# Patient Record
Sex: FEMALE | Race: OTHER | URBAN - METROPOLITAN AREA
[De-identification: names, ages, dates, MRNs, and addresses within clinical notes are randomized per-mention and may not be internally consistent; named-entity substitution may affect disease eponyms.]

---

## 2022-09-24 ENCOUNTER — EMERGENCY (EMERGENCY)
Facility: HOSPITAL | Age: 82
LOS: 0 days | Discharge: HOME | End: 2022-09-25
Attending: STUDENT IN AN ORGANIZED HEALTH CARE EDUCATION/TRAINING PROGRAM

## 2022-09-24 VITALS
WEIGHT: 125 LBS | TEMPERATURE: 98 F | OXYGEN SATURATION: 96 % | HEIGHT: 60 IN | SYSTOLIC BLOOD PRESSURE: 144 MMHG | HEART RATE: 85 BPM | DIASTOLIC BLOOD PRESSURE: 85 MMHG | RESPIRATION RATE: 18 BRPM

## 2022-09-24 DIAGNOSIS — M25.511 PAIN IN RIGHT SHOULDER: ICD-10-CM

## 2022-09-24 DIAGNOSIS — Z23 ENCOUNTER FOR IMMUNIZATION: ICD-10-CM

## 2022-09-24 DIAGNOSIS — I10 ESSENTIAL (PRIMARY) HYPERTENSION: ICD-10-CM

## 2022-09-24 DIAGNOSIS — M79.601 PAIN IN RIGHT ARM: ICD-10-CM

## 2022-09-24 DIAGNOSIS — S43.004A UNSPECIFIED DISLOCATION OF RIGHT SHOULDER JOINT, INITIAL ENCOUNTER: ICD-10-CM
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
## 2022-09-24 DIAGNOSIS — E78.5 HYPERLIPIDEMIA, UNSPECIFIED: ICD-10-CM

## 2022-09-24 PROCEDURE — 71046 X-RAY EXAM CHEST 2 VIEWS: CPT | Mod: 26

## 2022-09-24 PROCEDURE — 73060 X-RAY EXAM OF HUMERUS: CPT | Mod: 26,RT

## 2022-09-24 PROCEDURE — 99285 EMERGENCY DEPT VISIT HI MDM: CPT

## 2022-09-24 PROCEDURE — 73030 X-RAY EXAM OF SHOULDER: CPT | Mod: 26,RT

## 2022-09-24 PROCEDURE — 72125 CT NECK SPINE W/O DYE: CPT | Mod: 26,MA

## 2022-09-24 PROCEDURE — 73030 X-RAY EXAM OF SHOULDER: CPT | Mod: 26,RT,77

## 2022-09-24 PROCEDURE — 73080 X-RAY EXAM OF ELBOW: CPT | Mod: 26,RT

## 2022-09-24 PROCEDURE — 70450 CT HEAD/BRAIN W/O DYE: CPT | Mod: 26,MA

## 2022-09-24 PROCEDURE — 73020 X-RAY EXAM OF SHOULDER: CPT | Mod: 26,RT,59,76

## 2022-09-24 RX ORDER — TETANUS TOXOID, REDUCED DIPHTHERIA TOXOID AND ACELLULAR PERTUSSIS VACCINE, ADSORBED 5; 2.5; 8; 8; 2.5 [IU]/.5ML; [IU]/.5ML; UG/.5ML; UG/.5ML; UG/.5ML
0.5 SUSPENSION INTRAMUSCULAR ONCE
Refills: 0 | Status: COMPLETED | OUTPATIENT
Start: 2022-09-24 | End: 2022-09-24

## 2022-09-24 RX ORDER — MORPHINE SULFATE 50 MG/1
2 CAPSULE, EXTENDED RELEASE ORAL ONCE
Refills: 0 | Status: DISCONTINUED | OUTPATIENT
Start: 2022-09-24 | End: 2022-09-24

## 2022-09-24 RX ORDER — ACETAMINOPHEN 500 MG
975 TABLET ORAL ONCE
Refills: 0 | Status: COMPLETED | OUTPATIENT
Start: 2022-09-24 | End: 2022-09-24

## 2022-09-24 RX ORDER — DIAZEPAM 5 MG
5 TABLET ORAL ONCE
Refills: 0 | Status: DISCONTINUED | OUTPATIENT
Start: 2022-09-24 | End: 2022-09-24

## 2022-09-24 RX ORDER — MIDAZOLAM HYDROCHLORIDE 1 MG/ML
2 INJECTION, SOLUTION INTRAMUSCULAR; INTRAVENOUS ONCE
Refills: 0 | Status: DISCONTINUED | OUTPATIENT
Start: 2022-09-24 | End: 2022-09-24

## 2022-09-24 RX ORDER — LIDOCAINE 4 G/100G
1 CREAM TOPICAL ONCE
Refills: 0 | Status: COMPLETED | OUTPATIENT
Start: 2022-09-24 | End: 2022-09-24

## 2022-09-24 RX ADMIN — LIDOCAINE 1 PATCH: 4 CREAM TOPICAL at 17:41

## 2022-09-24 RX ADMIN — Medication 975 MILLIGRAM(S): at 17:41

## 2022-09-24 RX ADMIN — Medication 5 MILLIGRAM(S): at 18:36

## 2022-09-24 RX ADMIN — MIDAZOLAM HYDROCHLORIDE 2 MILLIGRAM(S): 1 INJECTION, SOLUTION INTRAMUSCULAR; INTRAVENOUS at 21:17

## 2022-09-24 RX ADMIN — Medication 975 MILLIGRAM(S): at 18:30

## 2022-09-24 RX ADMIN — MORPHINE SULFATE 2 MILLIGRAM(S): 50 CAPSULE, EXTENDED RELEASE ORAL at 21:17

## 2022-09-24 RX ADMIN — LIDOCAINE 1 PATCH: 4 CREAM TOPICAL at 19:43

## 2022-09-24 RX ADMIN — MORPHINE SULFATE 2 MILLIGRAM(S): 50 CAPSULE, EXTENDED RELEASE ORAL at 21:45

## 2022-09-24 RX ADMIN — TETANUS TOXOID, REDUCED DIPHTHERIA TOXOID AND ACELLULAR PERTUSSIS VACCINE, ADSORBED 0.5 MILLILITER(S): 5; 2.5; 8; 8; 2.5 SUSPENSION INTRAMUSCULAR at 17:42

## 2022-09-24 NOTE — ED PROVIDER NOTE - PHYSICAL EXAMINATION
General appearance: Nontoxic appearing, conversant, afebrile    Eyes: anicteric sclerae, KAYDEN, EOMI   HENT: Atraumatic; oropharynx clear, MMM and no ulcerations, no pharyngeal erythema or exudate. Has 2cm diameter ecchymosis on left side of the forehead with small superficial abrasion overline. No pain with extraocular movement.    Neck: Trachea midline; Full range of motion, supple   Pulm: CTA bl, normal respiratory effort and no intercostal retractions, normal work of breathing   CV: RRR, No murmurs, rubs, or gallops   Abdomen: Soft, non-tender, non-distended; no guarding or rebound   Extremities: No peripheral edema, no gross deformities, FROM x4, 5/5 MS x4, gross sensation intact. Limited ROM in right arm due to pain.  No obvious deformities. Neurovascularly intact.    Skin: Dry, normal temperature, turgor and texture; no rash   Psych: Appropriate affect, cooperative General appearance: Nontoxic appearing, conversant, afebrile    Eyes: anicteric sclerae, KAYDEN, EOMI   HENT: Atraumatic; oropharynx clear, MMM and no ulcerations, no pharyngeal erythema or exudate. Has 2cm diameter ecchymosis on left side of the forehead with small superficial abrasion overline. No pain with extraocular movement.    Neck: Trachea midline; Full range of motion, supple, no midline ttp   Pulm: CTA bl, normal respiratory effort and no intercostal retractions, normal work of breathing   CV: RRR, No murmurs, rubs, or gallops   Abdomen: Soft, non-tender, non-distended; no guarding or rebound   Back: No midline ttp, step offs, deformities, no cvat    Extremities: No peripheral edema, no gross deformities, FROM x4, 5/5 MS x4, gross sensation intact. Limited ROM in right arm due to pain.  No obvious deformities. Neurovascularly intact.    Skin: Dry, normal temperature, turgor and texture; no rash   Psych: Appropriate affect, cooperative

## 2022-09-24 NOTE — ED ADULT NURSE NOTE - NSIMPLEMENTINTERV_GEN_ALL_ED
Implemented All Fall Risk Interventions:  [place] to call system. Call bell, personal items and telephone within reach. Instruct patient to call for assistance. Room bathroom lighting operational. Non-slip footwear when patient is off stretcher. Physically safe environment: no spills, clutter or unnecessary equipment. Stretcher in lowest position, wheels locked, appropriate side rails in place. Provide visual cue, wrist band, yellow gown, etc. Monitor gait and stability. Monitor for mental status changes and reorient to person, place, and time. Review medications for side effects contributing to fall risk. Reinforce activity limits and safety measures with patient and family.

## 2022-09-24 NOTE — ED PROVIDER NOTE - PATIENT PORTAL LINK FT
You can access the FollowMyHealth Patient Portal offered by [facility] by registering at the following website: [email or web address]. By joining [facility]’s FollowMyHealth portal, you will also be able to view your health information using other applications (apps) compatible with our system.

## 2022-09-24 NOTE — ED PROVIDER NOTE - CLINICAL SUMMARY MEDICAL DECISION MAKING FREE TEXT BOX
83 y/o F presenting after mechanical fall. Will image. Rule out acute injuries. Reassess after getting results.

## 2022-09-24 NOTE — ED PROVIDER NOTE - OBJECTIVE STATEMENT
83 y/o F with a PMHx of HTN and HLD presents to the ED after slipping at home today. Patient states that she fell forward and landed on her head and right arm. Denies any head strikes or LOC. Patient reports that she is endorsing pain on the right upper extremities including the right shoulder and the right humerus. As per patient, pain exacerbates with movement of the right arm. Denies any other pain. Denies taking any pain control medication. Denies any numbness or weakness. 81 y/o F with a PMHx of HTN and HLD presents to the ED after slipping at home today while getting out of shower. Patient states that she fell forward and landed on her head and right arm. Denies any head strikes or LOC. Patient reports that she is endorsing pain on the right upper extremities including the right shoulder and the right humerus. Pain exacerbates with movement of the right arm. Denies any other pain. Denies taking any pain control medication. Denies any numbness or weakness.

## 2022-09-24 NOTE — CONSULT NOTE ADULT - SUBJECTIVE AND OBJECTIVE BOX
*** INCOMPLETE NOTE - CHARTING IN PROGRESS ***    82y Female right/left hand dominant presents c/o R/L shoulder pain sp mechanical fall. Denies Headstrike/LOC. Denies numbness, tingling paresthesias in affected extremity. Able to ambulate after fall. No other orthopedic injuries at this time    VITAL SIGNS:  T(C): 36.8 (09-24-22 @ 19:37), Max: 36.8 (09-24-22 @ 19:37)  T(F): 98.2 (09-24-22 @ 19:37), Max: 98.2 (09-24-22 @ 19:37)  HR: 85 (09-24-22 @ 19:37) (85 - 85)  BP: 122/67 (09-24-22 @ 19:37) (122/67 - 144/85)  RR: 17 (09-24-22 @ 19:37) (17 - 18)  SpO2: 95% (09-24-22 @ 19:37) (95% - 96%)    PHYSICAL EXAM  Gen: NAD  RUE:   Skin intact.   No anterior shoulder fullness; +Sulcus sign.   Unable to range shoulder 2/2 pain; Full painless ROM at elbow and wrist.   + Ax/Musc/Rad/Uln/Med/AIN/PIN.   C5-T1 SILT.   Radial pulse intact.   Compartments soft.     IMAGING:  XR Right Shoulder: Inferior glenohumeral joint dislocation.     PROCEDURE: 10 cc 1% lidocaine and 10 cc sterile saline injected under sterile procedure into R/L shoulder joint. Closed reduction performed. Post procedure imaging demonstrates located shoulder joint. Post procedure exam demonstrated NV intact.    *** INCOMPLETE NOTE - CHARTING IN PROGRESS ***    ASSESSMENT:  82F Right shoulder dislocation sp closed reduction    PLAN:  - Pain control  - NWB R/L UE in sling, keep c/d/i  - Ice  - Active movement of fingers/wrist/elbow encouraged  - Possible need for surgical intervention in future discussed with patient  - Follow up with  --- within 1 week, call office for appointment  - Ortho stable  - Will discuss with Dr. Lino and advise of any changes to the above plan.    *** INCOMPLETE NOTE - CHARTING IN PROGRESS ***  [Language line interpreters Grenadian - [id number]]    82F D ambulator w/ walker c/o R shoulder pain s/p mechanical fall. She was getting out of bed earlier this today and tripped. She landed on her R arm and immediately felt R shoulder pain. She was sent to [place] ED. She did hit her head and presents with a small bruise on her forehead, but denies LOC. Denies numbness, tingling paresthesias in the affected extremity. Able to ambulate after fall. Patient endorses Right Big toe pain since the fall. No other orthopedic complaints at this time    VITAL SIGNS:  T(C): 36.8 (09-24-22 @ 19:37), Max: 36.8 (09-24-22 @ 19:37)  T(F): 98.2 (09-24-22 @ 19:37), Max: 98.2 (09-24-22 @ 19:37)  HR: 85 (09-24-22 @ 19:37) (85 - 85)  BP: 122/67 (09-24-22 @ 19:37) (122/67 - 144/85)  RR: 17 (09-24-22 @ 19:37) (17 - 18)  SpO2: 95% (09-24-22 @ 19:37) (95% - 96%)    PHYSICAL EXAM  Gen: Alert, orientated in pain but overall NAD  RUE:   Skin intact.   No anterior shoulder fullness; +Sulcus sign.   Unable to range shoulder 2/2 pain; Full painless ROM at elbow and wrist.   + Ax/Musc/Rad/Uln/Med/AIN/PIN.   C5-T1 SILT.   Radial pulse intact.   Compartments soft.     RLE:   Skin intact and nail bed intact  Arthritic deviations of the toes noted  No TTP at the big toe or anywhere else along the foot  Able to range big toe without pain    IMAGING:  XR Right Shoulder: Inferior glenohumeral joint dislocation.     PROCEDURE: 10 cc 1% lidocaine and 10 cc sterile saline injected under sterile procedure into R/L shoulder joint. Closed reduction performed. Post procedure imaging demonstrates located shoulder joint. Post procedure exam demonstrated NV intact.    ASSESSMENT:  82F Right shoulder dislocation s/p closed reduction    PLAN:  - Pain control  - NWB R/L UE in sling, keep c/d/i  - Ice as needed  - Active movement of fingers/wrist/elbow encouraged  - Possible need for surgical intervention in future discussed with patient  - Follow up with Dr. Lino within 1 week, call office for appointment  - Ortho stable for discharge  - Discussed with Dr. Lino, who agrees with plan.

## 2022-09-24 NOTE — ED PROVIDER NOTE - CARE PROVIDER_API CALL
Cece Lino)  Orthopaedic Surgery; Surgery of the Hand  [address] [place] [address]  Phone: [phone]  Fax: [phone]  Follow Up Time: 4-6 Days

## 2022-09-24 NOTE — ED ADULT NURSE REASSESSMENT NOTE - NS ED NURSE REASSESS COMMENT FT1
pt alert and oriented x4, pt sent to xray
Received report from SELIN Spencer. Pt AOx4, respirations appear equal and unlabored, NAD noted. Safety measures maintained.

## 2022-09-24 NOTE — ED ADULT NURSE NOTE - ED STAT RN HANDOFF DETAILS
Handoff provided to RN Reena CURTIS. Pt is awake/alert Aox4, no active orders at this time. Pending evaluation.

## 2022-09-24 NOTE — ED ADULT NURSE NOTE - ED STAT RN HANDOFF DETAILS 2
Report received from SELIN maguire pt alert and oriented x4. c/o right arm pain s/p fall. sling maintained to R.arm, limited ROM to right arm due to pain, pt able to move fingers, + R. pulses. noted hematoma to L side of forehead. Dr. he made aware. Safety checks compld this shift/Safety rounds completed. Fall +skin precs in place. pending md her.

## 2022-09-24 NOTE — ED ADULT NURSE NOTE - IN THE PAST 12 MONTHS HAVE YOU USED DRUGS OTHER THAN THOSE REQUIRED FOR MEDICAL REASON?
Contacted patient to clarify dose of patient's imdur, patient did take 1 and 1/2 tablet today. Patient was advised after reviewing with Jessica Rowland NP, patient should take 30 mg daily only and if headaches returns patient could try and take 15 mg one half tablet. Patient verbalized understanding and repeated above instructions back to writer.    No

## 2022-09-24 NOTE — ED ADULT NURSE NOTE - OBJECTIVE STATEMENT
Pt received to [facility] for a witnessed fall today. States she was coming out of the bathroom and slipped due to the moist floor. Tried to break her fall with her R arm and injured her R shoulder. Arrives with R am in a sling. Limited ROM of R arm due to pain. Able to move fingers, + R radial pulse. + rom of LUE, LLE, and RLE. Hematoma observed above L eyebrok with small abrasion. Denies LOC after falling. Denies dizziness, changes in vision, N/V. In no apparent distress at this time. Aox4, speech is clear. Awaiting MD her. Citizen of [place] speaking, able to communicate with patient in her primary language (Citizen of [place]). Pt received to [facility] for a witnessed fall today. States she was coming out of the bathroom and slipped due to the moist floor. Tried to break her fall with her R arm and injured her R shoulder. Arrives with R am in a sling. Limited ROM of R arm due to pain. Able to move fingers, + R radial pulse. + ROM of LUE, LLE, and RLE. Hematoma observed above L eyebrow with small abrasion. No active bleeding. No other injuries observed. Denies LOC after falling. Denies dizziness, changes in vision, N/V. In no apparent distress at this time. Aox4, speech is clear. Respirations even/unlabored. No neuro deficits observed. Awaiting MD her.

## 2022-09-25 VITALS
SYSTOLIC BLOOD PRESSURE: 161 MMHG | OXYGEN SATURATION: 95 % | RESPIRATION RATE: 18 BRPM | HEART RATE: 100 BPM | TEMPERATURE: 98 F | DIASTOLIC BLOOD PRESSURE: 73 MMHG

## 2025-07-12 ENCOUNTER — INPATIENT (INPATIENT)
Facility: HOSPITAL | Age: 85
LOS: 3 days | Discharge: NURSING, REHAB OR LONG-TERM CARE | End: 2025-07-16
Attending: STUDENT IN AN ORGANIZED HEALTH CARE EDUCATION/TRAINING PROGRAM | Admitting: STUDENT IN AN ORGANIZED HEALTH CARE EDUCATION/TRAINING PROGRAM
Payer: MEDICARE

## 2025-07-12 VITALS
RESPIRATION RATE: 18 BRPM | OXYGEN SATURATION: 94 % | WEIGHT: 138.01 LBS | HEIGHT: 55 IN | TEMPERATURE: 99 F | DIASTOLIC BLOOD PRESSURE: 82 MMHG | SYSTOLIC BLOOD PRESSURE: 142 MMHG | HEART RATE: 93 BPM

## 2025-07-12 DIAGNOSIS — K21.9 GASTRO-ESOPHAGEAL REFLUX DISEASE WITHOUT ESOPHAGITIS: ICD-10-CM

## 2025-07-12 DIAGNOSIS — R32 UNSPECIFIED URINARY INCONTINENCE: ICD-10-CM

## 2025-07-12 DIAGNOSIS — R53.81 OTHER MALAISE: ICD-10-CM
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
## 2025-07-12 DIAGNOSIS — I10 ESSENTIAL (PRIMARY) HYPERTENSION: ICD-10-CM

## 2025-07-12 DIAGNOSIS — S32.001A STABLE BURST FRACTURE OF UNSPECIFIED LUMBAR VERTEBRA, INITIAL ENCOUNTER FOR CLOSED FRACTURE: ICD-10-CM

## 2025-07-12 DIAGNOSIS — E78.5 HYPERLIPIDEMIA, UNSPECIFIED: ICD-10-CM

## 2025-07-12 LAB
ALBUMIN SERPL ELPH-MCNC: 3 G/DL — LOW (ref 3.3–5)
ALP SERPL-CCNC: 72 U/L — SIGNIFICANT CHANGE UP (ref 40–120)
ALT FLD-CCNC: 30 U/L — SIGNIFICANT CHANGE UP (ref 12–78)
ANION GAP SERPL CALC-SCNC: 8 MMOL/L — SIGNIFICANT CHANGE UP (ref 5–17)
AST SERPL-CCNC: 20 U/L — SIGNIFICANT CHANGE UP (ref 15–37)
BASOPHILS # BLD AUTO: 0.03 K/UL — SIGNIFICANT CHANGE UP (ref 0–0.2)
BASOPHILS NFR BLD AUTO: 0.5 % — SIGNIFICANT CHANGE UP (ref 0–2)
BILIRUB SERPL-MCNC: 0.3 MG/DL — SIGNIFICANT CHANGE UP (ref 0.2–1.2)
BUN SERPL-MCNC: 10 MG/DL — SIGNIFICANT CHANGE UP (ref 7–23)
CALCIUM SERPL-MCNC: 8.9 MG/DL — SIGNIFICANT CHANGE UP (ref 8.5–10.1)
CHLORIDE SERPL-SCNC: 109 MMOL/L — HIGH (ref 96–108)
CO2 SERPL-SCNC: 26 MMOL/L — SIGNIFICANT CHANGE UP (ref 22–31)
CREAT SERPL-MCNC: 0.53 MG/DL — SIGNIFICANT CHANGE UP (ref 0.5–1.3)
EGFR: 91 ML/MIN/1.73M2 — SIGNIFICANT CHANGE UP
EGFR: 91 ML/MIN/1.73M2 — SIGNIFICANT CHANGE UP
EOSINOPHIL # BLD AUTO: 0.12 K/UL — SIGNIFICANT CHANGE UP (ref 0–0.5)
EOSINOPHIL NFR BLD AUTO: 1.8 % — SIGNIFICANT CHANGE UP (ref 0–6)
GLUCOSE SERPL-MCNC: 91 MG/DL — SIGNIFICANT CHANGE UP (ref 70–99)
HCT VFR BLD CALC: 34.6 % — SIGNIFICANT CHANGE UP (ref 34.5–45)
HGB BLD-MCNC: 10.7 G/DL — LOW (ref 11.5–15.5)
IMM GRANULOCYTES NFR BLD AUTO: 0.3 % — SIGNIFICANT CHANGE UP (ref 0–0.9)
LYMPHOCYTES # BLD AUTO: 1.73 K/UL — SIGNIFICANT CHANGE UP (ref 1–3.3)
LYMPHOCYTES # BLD AUTO: 26.6 % — SIGNIFICANT CHANGE UP (ref 13–44)
MCHC RBC-ENTMCNC: 25.1 PG — LOW (ref 27–34)
MCHC RBC-ENTMCNC: 30.9 G/DL — LOW (ref 32–36)
MCV RBC AUTO: 81.2 FL — SIGNIFICANT CHANGE UP (ref 80–100)
MONOCYTES # BLD AUTO: 0.73 K/UL — SIGNIFICANT CHANGE UP (ref 0–0.9)
MONOCYTES NFR BLD AUTO: 11.2 % — SIGNIFICANT CHANGE UP (ref 2–14)
NEUTROPHILS # BLD AUTO: 3.87 K/UL — SIGNIFICANT CHANGE UP (ref 1.8–7.4)
NEUTROPHILS NFR BLD AUTO: 59.6 % — SIGNIFICANT CHANGE UP (ref 43–77)
NRBC BLD AUTO-RTO: 0 /100 WBCS — SIGNIFICANT CHANGE UP (ref 0–0)
PLATELET # BLD AUTO: 352 K/UL — SIGNIFICANT CHANGE UP (ref 150–400)
POTASSIUM SERPL-MCNC: 3.6 MMOL/L — SIGNIFICANT CHANGE UP (ref 3.5–5.3)
POTASSIUM SERPL-SCNC: 3.6 MMOL/L — SIGNIFICANT CHANGE UP (ref 3.5–5.3)
PROT SERPL-MCNC: 6.7 GM/DL — SIGNIFICANT CHANGE UP (ref 6–8.3)
RBC # BLD: 4.26 M/UL — SIGNIFICANT CHANGE UP (ref 3.8–5.2)
RBC # FLD: 17.5 % — HIGH (ref 10.3–14.5)
SODIUM SERPL-SCNC: 143 MMOL/L — SIGNIFICANT CHANGE UP (ref 135–145)
WBC # BLD: 6.5 K/UL — SIGNIFICANT CHANGE UP (ref 3.8–10.5)
WBC # FLD AUTO: 6.5 K/UL — SIGNIFICANT CHANGE UP (ref 3.8–10.5)

## 2025-07-12 PROCEDURE — 93010 ELECTROCARDIOGRAM REPORT: CPT

## 2025-07-12 PROCEDURE — 72131 CT LUMBAR SPINE W/O DYE: CPT | Mod: 26

## 2025-07-12 PROCEDURE — 99285 EMERGENCY DEPT VISIT HI MDM: CPT

## 2025-07-12 PROCEDURE — 72070 X-RAY EXAM THORAC SPINE 2VWS: CPT | Mod: 26

## 2025-07-12 PROCEDURE — 72192 CT PELVIS W/O DYE: CPT | Mod: 26

## 2025-07-12 PROCEDURE — 72100 X-RAY EXAM L-S SPINE 2/3 VWS: CPT | Mod: 26

## 2025-07-12 PROCEDURE — 99222 1ST HOSP IP/OBS MODERATE 55: CPT

## 2025-07-12 RX ORDER — METOPROLOL SUCCINATE 50 MG/1
1 TABLET, EXTENDED RELEASE ORAL
Refills: 0 | DISCHARGE

## 2025-07-12 RX ORDER — MAGNESIUM, ALUMINUM HYDROXIDE 200-200 MG
30 TABLET,CHEWABLE ORAL EVERY 4 HOURS
Refills: 0 | Status: DISCONTINUED | OUTPATIENT
Start: 2025-07-12 | End: 2025-07-16

## 2025-07-12 RX ORDER — ONDANSETRON HCL/PF 4 MG/2 ML
4 VIAL (ML) INJECTION EVERY 8 HOURS
Refills: 0 | Status: DISCONTINUED | OUTPATIENT
Start: 2025-07-12 | End: 2025-07-16

## 2025-07-12 RX ORDER — LIDOCAINE HYDROCHLORIDE 20 MG/ML
1 JELLY TOPICAL ONCE
Refills: 0 | Status: DISCONTINUED | OUTPATIENT
Start: 2025-07-12 | End: 2025-07-12

## 2025-07-12 RX ORDER — ICOSAPENT ETHYL 500 MG/1
2 CAPSULE ORAL
Refills: 0 | DISCHARGE

## 2025-07-12 RX ORDER — FUROSEMIDE 10 MG/ML
20 INJECTION INTRAMUSCULAR; INTRAVENOUS DAILY
Refills: 0 | Status: DISCONTINUED | OUTPATIENT
Start: 2025-07-12 | End: 2025-07-16

## 2025-07-12 RX ORDER — FUROSEMIDE 10 MG/ML
1 INJECTION INTRAMUSCULAR; INTRAVENOUS
Refills: 0 | DISCHARGE

## 2025-07-12 RX ORDER — CYCLOBENZAPRINE HYDROCHLORIDE 15 MG/1
5 CAPSULE, EXTENDED RELEASE ORAL ONCE
Refills: 0 | Status: COMPLETED | OUTPATIENT
Start: 2025-07-12 | End: 2025-07-12

## 2025-07-12 RX ORDER — LIDOCAINE HYDROCHLORIDE 20 MG/ML
1 JELLY TOPICAL ONCE
Refills: 0 | Status: COMPLETED | OUTPATIENT
Start: 2025-07-12 | End: 2025-07-12

## 2025-07-12 RX ORDER — MELATONIN 5 MG
3 TABLET ORAL AT BEDTIME
Refills: 0 | Status: DISCONTINUED | OUTPATIENT
Start: 2025-07-12 | End: 2025-07-16

## 2025-07-12 RX ORDER — VIBEGRON 75 MG/1
1 TABLET, FILM COATED ORAL
Refills: 0 | DISCHARGE

## 2025-07-12 RX ORDER — CYCLOBENZAPRINE HYDROCHLORIDE 15 MG/1
5 CAPSULE, EXTENDED RELEASE ORAL THREE TIMES A DAY
Refills: 0 | Status: DISCONTINUED | OUTPATIENT
Start: 2025-07-12 | End: 2025-07-16

## 2025-07-12 RX ORDER — TRAMADOL HYDROCHLORIDE 50 MG/1
25 TABLET, FILM COATED ORAL EVERY 6 HOURS
Refills: 0 | Status: DISCONTINUED | OUTPATIENT
Start: 2025-07-12 | End: 2025-07-13

## 2025-07-12 RX ORDER — ATORVASTATIN CALCIUM 80 MG/1
10 TABLET, FILM COATED ORAL AT BEDTIME
Refills: 0 | Status: DISCONTINUED | OUTPATIENT
Start: 2025-07-12 | End: 2025-07-16

## 2025-07-12 RX ORDER — METOPROLOL SUCCINATE 50 MG/1
25 TABLET, EXTENDED RELEASE ORAL DAILY
Refills: 0 | Status: DISCONTINUED | OUTPATIENT
Start: 2025-07-12 | End: 2025-07-16

## 2025-07-12 RX ORDER — ACETAMINOPHEN 500 MG/5ML
650 LIQUID (ML) ORAL ONCE
Refills: 0 | Status: COMPLETED | OUTPATIENT
Start: 2025-07-12 | End: 2025-07-12

## 2025-07-12 RX ORDER — ATORVASTATIN CALCIUM 80 MG/1
1 TABLET, FILM COATED ORAL
Refills: 0 | DISCHARGE

## 2025-07-12 RX ORDER — IBANDRONATE SODIUM 150 MG/1
1 TABLET ORAL
Refills: 0 | DISCHARGE

## 2025-07-12 RX ORDER — ACETAMINOPHEN 500 MG/5ML
650 LIQUID (ML) ORAL EVERY 6 HOURS
Refills: 0 | Status: DISCONTINUED | OUTPATIENT
Start: 2025-07-12 | End: 2025-07-16

## 2025-07-12 RX ADMIN — Medication 650 MILLIGRAM(S): at 13:29

## 2025-07-12 RX ADMIN — ATORVASTATIN CALCIUM 10 MILLIGRAM(S): 80 TABLET, FILM COATED ORAL at 21:44

## 2025-07-12 RX ADMIN — Medication 2 MILLIGRAM(S): at 18:21

## 2025-07-12 RX ADMIN — CYCLOBENZAPRINE HYDROCHLORIDE 5 MILLIGRAM(S): 15 CAPSULE, EXTENDED RELEASE ORAL at 16:41

## 2025-07-12 RX ADMIN — LIDOCAINE HYDROCHLORIDE 1 PATCH: 20 JELLY TOPICAL at 13:29

## 2025-07-12 NOTE — ED PROVIDER NOTE - ATTENDING APP SHARED VISIT CONTRIBUTION OF CARE
This patient was evaluated with ROSHNI Fish.  The patient's HPI, ROS, and physical exam above were noted and agreed to unless otherwise commented on below.  Nursing notes and vital signs were reviewed.     HPI: Patient is an 84-year-old female presenting to the emergency department today for back pain.  She has a history of hypertension, hyperlipidemia, and prior right hip surgery with lower back pain.  She fell on Monday and fell backwards hitting her head.  Went to [facility] CT of the head was done.  Ultimately discharged to home.  States that since then she has been experiencing pain in her lower back with difficulty walking.  No urinary retention.  No fecal incontinence.  No numbness tingling in extremities.  She states that she has difficulty walking secondary to pain.  No other complaints at this time.    Pertinent Physical Exam:   GENERAL: The patient appears well and is in no apparent distress.    EYES: Pupils equal and reactive.  Extraocular eye movements are intact.    ENT: Head is atraumatic.  Posterior oropharynx is unremarkable.      SKIN: Skin is intact without evidence of significant lacerations or sores.    MUSCULOSKELETAL: Tender to palpation over the lumbar spine.    NEUROLOGIC: Cranial nerves II through XII are grossly within normal limits.  Sensory and motor examination is unremarkable.    PSYCHIATRIC: Patient is awake, alert, and oriented ×4.    MDM: Patient was initially given Tylenol, Flexeril, lidocaine patches and morphine.    On reevaluation, the patient had continued pain in the area.  X-rays and CT scans were taken that show evidence of acute L1 burst compression fracture with moderate loss of height as well as grade 1 L3-S1 anterolisthesis.    Orthopedic surgery was consulted and they state that there is no surgical intervention required at this time.  Patient's pain was managed.    Did discuss discharge to home with the patient, however the patient states that she feels unsafe for discharge at this time.  She has a lot of difficulty with walking and feels that she would be best served in a rehab facility.  She would like to be placed at this time.    For this reason, we believe the patient would benefit from admission to the hospital for further workup and management.  Discussed this case with the hospitalist service who accepted admission for the patient.

## 2025-07-12 NOTE — H&P ADULT - NSHPPHYSICALEXAM_GEN_ALL_CORE
T(C): 37.1 (07-12-25 @ 20:53), Max: 37.4 (07-12-25 @ 11:12)  HR: 74 (07-12-25 @ 20:53) (74 - 93)  BP: 135/79 (07-12-25 @ 20:53) (135/79 - 142/82)  RR: 18 (07-12-25 @ 20:53) (18 - 18)  SpO2: 92% (07-12-25 @ 20:53) (92% - 94%)    CONSTITUTIONAL: Well groomed, in mild distress, pleasant, conversational  EYES: PERRLA and symmetric, EOMI  ENMT: Oral mucosa with moist membranes  RESP: No respiratory distress, no use of accessory muscles; CTA b/l  CV: RRR  GI: Soft, NT, ND

## 2025-07-12 NOTE — ED PROVIDER NOTE - CLINICAL SUMMARY MEDICAL DECISION MAKING FREE TEXT BOX
85 y/o female with htn, hld here with lower back pain x 6 days s/p fall.   No neurological deficits or red flags.   Will obtain ct pelvis and ct lumbar spine r/o fracture and treat with pain meds. 85 y/o female with htn, hld here with lower back pain x 6 days s/p fall.   No neurological deficits or red flags.   Will obtain ct pelvis and ct lumbar spine r/o fracture and treat with pain meds.    ct lumbar: Acute L1 burst compression fracture with moderate loss of height   centrally in the setting of osteopenia. Mild bony retropulsion.  Grade 1 L3-S1 anterolisthesis and uncovering of the disc due to   hypertrophic facet/ligamentous degeneration    ct pelvis: CT of the pelvis demonstrates surgical changes at the right hemipelvis   including a right total hip arthroplasty and threaded screws along the   superior margin of the acetabulum and iliac wing. Heterotopic   ossification along the superior lateral margin of the right hip. There is   no evidence of acute fracture or dislocation.  Ortho was consulted for acute l1 burst compression fracture.   Pt was seen by ortho and cleared to be discharged home. Upon reassessment pt still with lower back pain and cant really walk due to pain. Will admit for PT possibly rehab.

## 2025-07-12 NOTE — H&P ADULT - ASSESSMENT
Teresa Dallas is an 84 year old female with PMHx of HTN, HLD, GERD, and urinary incontinence who presented to the ED on 7/12/25 for complaints of waist pain and admitted for physical deconditioning secondary to L1 fracture due to mechanical fall.    Physical deconditioning secondary to L1 fracture due to mechanical fall  Reports fall while ambulating to bathroom on Monday, landed on her back and hit her head  Went to [facility] and underwent CT head at that time which was reportedly unremarkable, able to ambulate at that time so discharged home  Started having pain around her waist with inability to ambulate the following day on Tuesday  No associated saddle anesthesia or bowel incontinence, has baseline urinary incontinence  Baseline functional status is ambulates with cane  CT L-spine with acute L1 burst compression fracture with moderate loss of height centrally in the setting of osteopenia, mild bony retropulsion, grade 1 L3-S1 anterolisthesis and uncovering of the disc due to hypertrophic facet/ligamentous degeneration  CT pelvis without evidence of acute fracture or dislocation  S/p acetaminophen 650 mg PO, cyclobenzaprine 5 mg PO, lidocaine patch, and morphine 2 mg IV in the ED  Pain management PRN, abdominal binder for comfort, fall precautions  Will need TLSO brace outpatient, PT/OT WBAT as per ortho  Will need outpatient f/u with orthopedic surgery in 3-5 days  Orthopedic surgery recommendations appreciated      Chronic medical conditions:  HTN: PTA metoprolol succinate 25 mg, furosemide 20 mg  HLD: PTA atorvastatin 10 mg, vascepa 1 g held given not on formulary  GERD: PTA pantoprazole 20 mg reordered as 40 mg  Urinary incontinence: PTA vibegron 75 mg held given not on formulary    Medication reconciliation completed using recent prescription fill history from [facility] as patient does not know med list. Please call her pharmacy for med list in AM.

## 2025-07-12 NOTE — H&P ADULT - HISTORY OF PRESENT ILLNESS
Teresa Dallas is an 84 year old female with PMHx of HTN, HLD, GERD, and urinary incontinence who presented to the ED on 7/12/25 for complaints of waist pain.    Buck ID #[id number] utilized. Patient reports she was going to use the bathroom on Monday and fell, landed on her back and hit her head. No loss of consciousness. At that time, went to an ER in [place] and underwent head imaging which was reportedly negative. Discharged home and able to ambulate at that time. The following day, on Tuesday, started having pain around her waist. Described as "so strong I was unable to walk." Severity was 10/10. Did take analgesics which only temporarily alleviated pain. No associated saddle anesthesia or bowel incontinence. Has baseline urinary incontinence. Came to the ER for further evaluation. Baseline functional status is ambulates with cane and dependent with all ADLs. Lives at home with sister and brother-in-law.    In the ED, VSS except Tmax 99.3. Labs grossly unremarkable except hgb 10.7. CT L-spine with acute L1 burst compression fracture with moderate loss of height centrally in the setting of osteopenia. Mild bony retropulsion. Grade 1 L3-S1 anterolisthesis and uncovering of the disc due to hypertrophic facet/ligamentous degeneration. CT pelvis without evidence of acute fracture or dislocation. Received acetaminophen 650 mg PO, cyclobenzaprine 5 mg PO, lidocaine patch, and morphine 2 mg IV. Evaluated by orthopedic surgery who recommends WBAT and abdominal binder for comfort.

## 2025-07-12 NOTE — ED PROVIDER NOTE - PHYSICAL EXAMINATION
GEN: Awake, alert, interactive, NAD.  HEAD AND NECK: NC/AT. Airway patent. Neck supple.   EYES:  Clear b/l. EOMI. PERRL.   ENT: Moist mucus membranes.   CARDIAC: Regular rate, regular rhythm. No evident pedal edema.    RESP/CHEST: Normal respiratory effort with no use of accessory muscles or retractions. Clear throughout on auscultation.  ABD: soft, non-distended, non-tender. No rebound, no guarding.   BACK: (+) diffuse lumbar and sacral tenderness.  No CVAT.   EXTREMITIES: Moving all extremities with no apparent deformities.   SKIN: Warm, dry, intact normal color. No rash.   NEURO: AOx3, CN II-XII grossly intact, no focal deficits.   PSYCH: Appropriate mood and affect.

## 2025-07-12 NOTE — ED PROVIDER NOTE - CARE PLAN
Principal Discharge DX:	Low back pain   1 Principal Discharge DX:	Low back pain  Secondary Diagnosis:	Lumbar burst fracture

## 2025-07-12 NOTE — H&P ADULT - NSICDXPASTMEDICALHX_GEN_ALL_CORE_FT
PAST MEDICAL HISTORY:  GERD (gastroesophageal reflux disease)     HLD (hyperlipidemia)     Hypertension     Urinary incontinence

## 2025-07-12 NOTE — ED PROVIDER NOTE - OBJECTIVE STATEMENT
85 y/o female with htn, hld , history right hip surgery here with lower back pain s/p fall on monday. Pt states she was hit with a door and fell backwards hitting her head. Pt went to [facility] and had ct head done which was negative. Pt the next day noted lower back pain with difficulty walking. Pt denies numbness, tingling, weakness in the legs. Pt has chronic urinary incontinence. Denies bowel incontinence. Pt didn't take anything for pain. Pain is worse with movement. Denies fever, chills, nausea, vomiting, abdominal pain.

## 2025-07-12 NOTE — ED ADULT TRIAGE NOTE - CHIEF COMPLAINT QUOTE
83 yo female with hx of HTN, HLD presents to ED c/o back pain, reports falling on Monday, landing on her buttocks , + head strike, denies loc. Reports went to ED and had head CT scan that was negative for acute findings. States on Tuesday she started to have lower back pain, has been unable to walk for more than a couple of steps. Reports taking Neurobion for pain at 4;30am without relief, Denies numbness tingling, loss of urinary/bowel control. Hx HTN, HLD 83 yo female with hx of HTN, HLD presents to ED c/o back pain, reports falling on Monday, landing on her buttocks , + head strike, denies loc. Reports went to ED and had head CT scan that was negative for acute findings. States on Tuesday she started to have lower back pain, has been unable to walk for more than a couple of steps. Reports taking Neurobion for pain at 4;30am without relief, Denies numbness tingling, loss of urinary/bowel control.

## 2025-07-12 NOTE — H&P ADULT - NSHPLABSRESULTS_GEN_ALL_CORE
10.7   6.50  )-----------( 352      ( 12 Jul 2025 18:00 )             34.6     143  |  109[H]  |  10  ----------------------------<  91     07-12  3.6   |  26  |  0.53    Ca    8.9      12 Jul 2025 18:00    TPro  6.7  /  Alb  3.0[L]  /  TBili  0.3  /  DBili  x   /  AST  20  /  ALT  30  /  AlkPhos  72  07-12    CT L-spine without contrast 7/12/25  FINDINGS:  Acute L1 burst compression fracture with subchondral sclerosis and approximately 33% loss of height centrally. There is cortical disruption along the superior endplate extending into the anterior cortex along a prominent osteophyte. Mild bony retropulsion into the canal. Diffuse osteopenia is present. Remaining vertebral body heights are maintained.    Grade 1 anterolisthesis of L3 on L4, L4 on L5, and L5 on S1 due to advanced hypertrophic facet/ligamentous degeneration and mild uncovering of the disc. Advanced L3-L4 disc degeneration with near complete loss of height and endplate irregularity and prominent osteophyte formation, asymmetric to the left.    Sacroiliac joints are patent bilaterally. Changes from right hip arthroplasty are partially imaged.    Multiple calcifications associated with the right uterus/adnexa may reflect underlying calcified fibroid. Atherosclerotic calcification of the abdominal aorta and major branch vessels is noted.    IMPRESSION:  *Acute L1 burst compression fracture with moderate loss of height centrally in the setting of osteopenia. Mild bony retropulsion.    Grade 1 L3-S1 anterolisthesis and uncovering of the disc due to hypertrophic facet/ligamentous degeneration    CT pelvis bony without contrast 7/12/25  FINDINGS:  Mild anterolisthesis at the L3-L4 level. Multilevel degenerative changes of the lumbar spine and facet joints. Please see same-day CT of the lumbar spine dictated separately for complete evaluation.    Osteopenia is present. Degenerative changes of the sacroiliac joints and the pubic symphysis.    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    Right total hip arthroplasty with cerclage wires and threaded screws along the superior margin of the acetabulum/iliac wing. Heterotopic ossification along the lateral superior margin of the hip arthroplasty. There is no acute fracture or dislocation appreciated. No bulky ostial lysis.    Atherosclerotic vascular calcifications are present. Diffuse bladder wall thickening, nonspecific in the nondilated setting. Colonic diverticulosis is present. Moderate atrophy of the right adductor muscle bellies.    IMPRESSION:  CT of the pelvis demonstrates surgical changes at the right hemipelvis including a right total hip arthroplasty and threaded screws along the superior margin of the acetabulum and iliac wing. Heterotopic ossification along the superior lateral margin of the right hip. There is no evidence of acute fracture or dislocation.

## 2025-07-12 NOTE — ED ADULT NURSE NOTE - CHIEF COMPLAINT QUOTE
85 yo female with hx of HTN, HLD presents to ED c/o back pain, reports falling on Monday, landing on her buttocks , + head strike, denies loc. Reports went to ED and had head CT scan that was negative for acute findings. States on Tuesday she started to have lower back pain, has been unable to walk for more than a couple of steps. Reports taking Neurobion for pain at 4;30am without relief, Denies numbness tingling, loss of urinary/bowel control.

## 2025-07-12 NOTE — ED ADULT NURSE REASSESSMENT NOTE - NS ED NURSE REASSESS COMMENT FT1
aox4, amb w cane w difficulty 2/2 pain, Faroese speaking primarily. Pt offered pain meds however refused, states she will have later. IV site & patency inspected and integrity maintained. R FA 20g patent. pending bed for admission

## 2025-07-12 NOTE — CONSULT NOTE ADULT - SUBJECTIVE AND OBJECTIVE BOX
Brianna Dallas is a pleasant 84F who presented to [facility] ED for residual axial lumbar back pain following a MF on 7/7. Patient endorses +HS, which was worked up at an outside ED that evening with negative imaging. She states she has been able to ambulate with a cane following the injury, which is her baseline, though she reports difficulty 2/2 pain. Patient denies numbness/tingling, saddle anesthesia, bowel incontinence, radicular pain and new weakness. Patient does endorse chronic urinary incontinence managed with medications. No AC.      VITAL SIGNS:  T(C): 37.4 (07-12-25 @ 11:12), Max: 37.4 (07-12-25 @ 11:12)  HR: 93 (07-12-25 @ 11:12) (93 - 93)  BP: 142/82 (07-12-25 @ 11:12) (142/82 - 142/82)  RR: 18 (07-12-25 @ 11:12) (18 - 18)  SpO2: 94% (07-12-25 @ 11:12) (94% - 94%)    Gen: lying comfortably, in NAD    Motor:                   C5                C6              C7               C8           T1   R            5/5                5/5            5/5             5/5          5/5  L             5/5               5/5             5/5             5/5          5/5                L2             L3             L4               L5            S1  R         5/5           5/5          5/5             5/5           5/5  L          5/5          5/5           5/5             5/5           5/5    Sensory:            C5         C6         C7      C8       T1        (0=absent, 1=impaired, 2=normal, NT=not testable)  R         2            2           2        2         2  L          2            2           2        2         2               L2          L3         L4      L5       S1         (0=absent, 1=impaired, 2=normal, NT=not testable)  R         2            2            2        2        2  L          2            2           2        2         2    Positive Casillas Bilaterally  Negative Babinski, and clonus bilaterally  Radial 2+ bilaterally  DP 2+ bilaterally     < from: CT Lumbar Spine No Cont (07.12.25 @ 13:51) >  PROCEDURE DATE:  07/12/2025          INTERPRETATION:  HISTORY: Low back pain. Fall.    COMPARISON: None.    TECHNIQUE: Axial noncontrast CT images of the lumbar spine were obtained.   Coronal and sagittal reconstructions were performed. Bone and soft tissue   windows were evaluated.    FINDINGS:    Acute L1 burst compression fracture with subchondral sclerosis and   approximately 33% loss of height centrally. There is cortical disruption   along the superior endplate extending into the anterior cortex along a   prominent osteophyte. Mild bony retropulsion into the canal. Diffuse   osteopenia is present. Remaining vertebral body heights are maintained.    Grade 1 anterolisthesis of L3 on L4, L4 on L5, and L5 on S1 due to   advanced hypertrophic facet/ligamentous degeneration and mild uncovering   of the disc. Advanced L3-L4 disc degeneration with near complete loss of   height and endplate irregularity and prominent osteophyte formation,   asymmetric to the left.    Sacroiliac joints are patent bilaterally.  Changes from right hip arthroplasty are partially imaged.    Multiple calcifications associated with the right uterus/adnexa may   reflect underlying calcifiedfibroid.  Atherosclerotic calcification of the abdominal aorta and major branch   vessels is noted.    IMPRESSION:    *Acute L1 burst compression fracture with moderate loss of height   centrally in the setting of osteopenia. Mild bony retropulsion.    Grade 1 L3-S1 anterolisthesis and uncovering of the disc due to   hypertrophic facet/ligamentous degeneration    --- End of Report ---    < end of copied text >    A&P:  84F who presented to [facility] ED for residual axial lumbar back pain following a MF on 7/7.  Finding of Acute L1 Burst Fx and L3-S1 Grade Anterolisthesis on CT Scan. No concern for cauda equina syndrome. Recommend conservative treatment and outpatient follow up with Dr. Sigala.    **** INCOMPLETE NOTE****PENDING FURTHER RECOMMENDATIONS****    -Pain control per ED  -Flexeril 5 TID PRN  -WBAT  -DVT ppx encourage ambulation as tolerated   -Abdominal binder for comfort   -TLSO brace outpatient if discharged home today  -F/u outpatient in 3-5 days  -XR T/L Spine ordered    !!!!!! Incomplete Consult Note, More Information to Follow  !!!!!!  !!!!!! Incomplete Consult Note, More Information to Follow  !!!!!!  !!!!!! Incomplete Consult Note, More Information to Follow  !!!!!!  !!!!!! Incomplete Consult Note, More Information to Follow  !!!!!!  !!!!!! Incomplete Consult Note, More Information to Follow  !!!!!!    Plan to be discussed with Dr. Sigala, any future changes will be documented Brianna Dallas is a pleasant 84F who presented to [facility] ED for residual axial lumbar back pain following a MF on 7/7. Patient endorses +HS, which was worked up at an outside ED that evening with negative imaging. She states she has been able to ambulate with a cane following the injury, which is her baseline, though she reports difficulty 2/2 pain. Patient denies numbness/tingling, saddle anesthesia, bowel incontinence, radicular pain and new weakness. Patient does endorse chronic urinary incontinence managed with medications. No AC.      VITAL SIGNS:  T(C): 37.4 (07-12-25 @ 11:12), Max: 37.4 (07-12-25 @ 11:12)  HR: 93 (07-12-25 @ 11:12) (93 - 93)  BP: 142/82 (07-12-25 @ 11:12) (142/82 - 142/82)  RR: 18 (07-12-25 @ 11:12) (18 - 18)  SpO2: 94% (07-12-25 @ 11:12) (94% - 94%)    Gen: lying comfortably, in NAD    Motor:                   C5                C6              C7               C8           T1   R            5/5                5/5            5/5             5/5          5/5  L             5/5               5/5             5/5             5/5          5/5                L2             L3             L4               L5            S1  R         5/5           5/5          5/5             5/5           5/5  L          5/5          5/5           5/5             5/5           5/5    Sensory:            C5         C6         C7      C8       T1        (0=absent, 1=impaired, 2=normal, NT=not testable)  R         2            2           2        2         2  L          2            2           2        2         2               L2          L3         L4      L5       S1         (0=absent, 1=impaired, 2=normal, NT=not testable)  R         2            2            2        2        2  L          2            2           2        2         2    Positive Casillas Bilaterally  Negative Babinski, and clonus bilaterally  Radial 2+ bilaterally  DP 2+ bilaterally     < from: CT Lumbar Spine No Cont (07.12.25 @ 13:51) >  PROCEDURE DATE:  07/12/2025          INTERPRETATION:  HISTORY: Low back pain. Fall.    COMPARISON: None.    TECHNIQUE: Axial noncontrast CT images of the lumbar spine were obtained.   Coronal and sagittal reconstructions were performed. Bone and soft tissue   windows were evaluated.    FINDINGS:    Acute L1 burst compression fracture with subchondral sclerosis and   approximately 33% loss of height centrally. There is cortical disruption   along the superior endplate extending into the anterior cortex along a   prominent osteophyte. Mild bony retropulsion into the canal. Diffuse   osteopenia is present. Remaining vertebral body heights are maintained.    Grade 1 anterolisthesis of L3 on L4, L4 on L5, and L5 on S1 due to   advanced hypertrophic facet/ligamentous degeneration and mild uncovering   of the disc. Advanced L3-L4 disc degeneration with near complete loss of   height and endplate irregularity and prominent osteophyte formation,   asymmetric to the left.    Sacroiliac joints are patent bilaterally.  Changes from right hip arthroplasty are partially imaged.    Multiple calcifications associated with the right uterus/adnexa may   reflect underlying calcifiedfibroid.  Atherosclerotic calcification of the abdominal aorta and major branch   vessels is noted.    IMPRESSION:    *Acute L1 burst compression fracture with moderate loss of height   centrally in the setting of osteopenia. Mild bony retropulsion.    Grade 1 L3-S1 anterolisthesis and uncovering of the disc due to   hypertrophic facet/ligamentous degeneration    --- End of Report ---    < end of copied text >    A&P:  84F who presented to [facility] ED for residual axial lumbar back pain following a MF on 7/7.  Finding of Acute L1 Burst Fx and L3-S1 Grade Anterolisthesis on CT Scan. No concern for cauda equina syndrome. Recommend conservative treatment and outpatient follow up with Dr. Sigala.    -Pain control per ED  -Flexeril 5 TID PRN  -WBAT  -DVT ppx encourage ambulation as tolerated   -Abdominal binder for comfort   -TLSO brace outpatient if discharged home today  -F/u outpatient in 3-5 days  -XR T/L Spine ordered    Discussed with Dr. Sigala who is aware and agrees with plan.     No acute orthopaedic surgical intervention indicated at this time. This patient is orthopaedically stable for discharge.   Patient to follow up with Dr. Auguste as an outpatient for further evaluation and management.   All of the patient's questions and concerns were answered and addressed.   Brianna Dallas is a pleasant 84F who presented to [facility] ED for residual axial lumbar back pain following a MF on 7/7. Patient endorses +HS, which was worked up at an outside ED that evening with negative imaging. She states she has been able to ambulate with a cane following the injury, which is her baseline, though she reports difficulty 2/2 pain. Patient denies numbness/tingling, saddle anesthesia, bowel incontinence, radicular pain and new weakness. Patient does endorse chronic urinary incontinence managed with medications. No AC.      VITAL SIGNS:  T(C): 37.4 (07-12-25 @ 11:12), Max: 37.4 (07-12-25 @ 11:12)  HR: 93 (07-12-25 @ 11:12) (93 - 93)  BP: 142/82 (07-12-25 @ 11:12) (142/82 - 142/82)  RR: 18 (07-12-25 @ 11:12) (18 - 18)  SpO2: 94% (07-12-25 @ 11:12) (94% - 94%)    Gen: lying comfortably, in NAD    Motor:                   C5                C6              C7               C8           T1   R            5/5                5/5            5/5             5/5          5/5  L             5/5               5/5             5/5             5/5          5/5                L2             L3             L4               L5            S1  R         5/5           5/5          5/5             5/5           5/5  L          5/5          5/5           5/5             5/5           5/5    Sensory:            C5         C6         C7      C8       T1        (0=absent, 1=impaired, 2=normal, NT=not testable)  R         2            2           2        2         2  L          2            2           2        2         2               L2          L3         L4      L5       S1         (0=absent, 1=impaired, 2=normal, NT=not testable)  R         2            2            2        2        2  L          2            2           2        2         2    Positive Casillas Bilaterally  Negative Babinski, and clonus bilaterally  Radial 2+ bilaterally  DP 2+ bilaterally     < from: CT Lumbar Spine No Cont (07.12.25 @ 13:51) >  PROCEDURE DATE:  07/12/2025          INTERPRETATION:  HISTORY: Low back pain. Fall.    COMPARISON: None.    TECHNIQUE: Axial noncontrast CT images of the lumbar spine were obtained.   Coronal and sagittal reconstructions were performed. Bone and soft tissue   windows were evaluated.    FINDINGS:    Acute L1 burst compression fracture with subchondral sclerosis and   approximately 33% loss of height centrally. There is cortical disruption   along the superior endplate extending into the anterior cortex along a   prominent osteophyte. Mild bony retropulsion into the canal. Diffuse   osteopenia is present. Remaining vertebral body heights are maintained.    Grade 1 anterolisthesis of L3 on L4, L4 on L5, and L5 on S1 due to   advanced hypertrophic facet/ligamentous degeneration and mild uncovering   of the disc. Advanced L3-L4 disc degeneration with near complete loss of   height and endplate irregularity and prominent osteophyte formation,   asymmetric to the left.    Sacroiliac joints are patent bilaterally.  Changes from right hip arthroplasty are partially imaged.    Multiple calcifications associated with the right uterus/adnexa may   reflect underlying calcifiedfibroid.  Atherosclerotic calcification of the abdominal aorta and major branch   vessels is noted.    IMPRESSION:    *Acute L1 burst compression fracture with moderate loss of height   centrally in the setting of osteopenia. Mild bony retropulsion.    Grade 1 L3-S1 anterolisthesis and uncovering of the disc due to   hypertrophic facet/ligamentous degeneration    --- End of Report ---    < end of copied text >    A&P:  84F who presented to [facility] ED for residual axial lumbar back pain following a MF on 7/7.  Finding of Acute L1 Burst Fx and L3-S1 Grade Anterolisthesis on CT Scan. No concern for cauda equina syndrome. Recommend conservative treatment and outpatient follow up with Dr. Sigala.    -Pain control per ED  -Flexeril 5 TID PRN  -WBAT  -DVT ppx per primary, recommend Lovenox while in house  -Abdominal binder for comfort   -TLSO brace outpatient if discharged home today  -F/u outpatient in 3-5 days  -XR T/L Spine ordered    Discussed with Dr. Sigala who is aware and agrees with plan.     No acute orthopaedic surgical intervention indicated at this time. This patient is orthopaedically stable for discharge.   Patient to follow up with Dr. Auguste as an outpatient for further evaluation and management.   All of the patient's questions and concerns were answered and addressed.

## 2025-07-12 NOTE — H&P ADULT - TIME BILLING
coordination of care with ER PA and ER RN, obtaining history, performing a physical examination, reviewing and interpreting labs and imaging, ordering further studies and tests, explaining the diagnosis and treatment plan to patient, attempting to complete medication reconciliation to the best of my ability, and documentation as above.

## 2025-07-12 NOTE — GOALS OF CARE CONVERSATION - ADVANCED CARE PLANNING - CONVERSATION DETAILS
[employer] ID #[id number] utilized. Code status is full code. Would want chest compressions and intubation if needed. Wants all life-sustaining measures. No limitations on care at this time. . Wants sister, Nabila Dallas ([phone]) to make her medical decisions for her if she were to become unable to do so on her own.

## 2025-07-13 PROCEDURE — 99232 SBSQ HOSP IP/OBS MODERATE 35: CPT

## 2025-07-13 RX ORDER — SENNA 187 MG
1 TABLET ORAL ONCE
Refills: 0 | Status: DISCONTINUED | OUTPATIENT
Start: 2025-07-13 | End: 2025-07-16

## 2025-07-13 RX ORDER — POLYETHYLENE GLYCOL 3350 17 G/17G
17 POWDER, FOR SOLUTION ORAL DAILY
Refills: 0 | Status: DISCONTINUED | OUTPATIENT
Start: 2025-07-13 | End: 2025-07-16

## 2025-07-13 RX ORDER — LIDOCAINE HYDROCHLORIDE 20 MG/ML
1 JELLY TOPICAL DAILY
Refills: 0 | Status: DISCONTINUED | OUTPATIENT
Start: 2025-07-13 | End: 2025-07-16

## 2025-07-13 RX ORDER — SENNA 187 MG
2 TABLET ORAL AT BEDTIME
Refills: 0 | Status: DISCONTINUED | OUTPATIENT
Start: 2025-07-13 | End: 2025-07-16

## 2025-07-13 RX ADMIN — TRAMADOL HYDROCHLORIDE 25 MILLIGRAM(S): 50 TABLET, FILM COATED ORAL at 22:14

## 2025-07-13 RX ADMIN — POLYETHYLENE GLYCOL 3350 17 GRAM(S): 17 POWDER, FOR SOLUTION ORAL at 22:21

## 2025-07-13 RX ADMIN — Medication 2 MILLIGRAM(S): at 00:00

## 2025-07-13 RX ADMIN — Medication 2 MILLIGRAM(S): at 09:25

## 2025-07-13 RX ADMIN — CYCLOBENZAPRINE HYDROCHLORIDE 5 MILLIGRAM(S): 15 CAPSULE, EXTENDED RELEASE ORAL at 06:43

## 2025-07-13 RX ADMIN — METOPROLOL SUCCINATE 25 MILLIGRAM(S): 50 TABLET, EXTENDED RELEASE ORAL at 06:43

## 2025-07-13 RX ADMIN — LIDOCAINE HYDROCHLORIDE 1 PATCH: 20 JELLY TOPICAL at 06:42

## 2025-07-13 RX ADMIN — FUROSEMIDE 20 MILLIGRAM(S): 10 INJECTION INTRAMUSCULAR; INTRAVENOUS at 06:43

## 2025-07-13 RX ADMIN — ATORVASTATIN CALCIUM 10 MILLIGRAM(S): 80 TABLET, FILM COATED ORAL at 22:14

## 2025-07-13 RX ADMIN — Medication 2 TABLET(S): at 22:15

## 2025-07-13 RX ADMIN — TRAMADOL HYDROCHLORIDE 25 MILLIGRAM(S): 50 TABLET, FILM COATED ORAL at 23:14

## 2025-07-13 RX ADMIN — LIDOCAINE HYDROCHLORIDE 1 PATCH: 20 JELLY TOPICAL at 09:20

## 2025-07-13 RX ADMIN — Medication 40 MILLIGRAM(S): at 06:43

## 2025-07-13 RX ADMIN — Medication 2 MILLIGRAM(S): at 10:25

## 2025-07-13 RX ADMIN — Medication 650 MILLIGRAM(S): at 00:00

## 2025-07-13 NOTE — PHYSICAL THERAPY INITIAL EVALUATION ADULT - GAIT TRAINING, PT EVAL
Pt will improve ambulation to ~100 feet Independently using rolling walker within 3 weeks. Pt will negotiate ~12 steps c rail Independently within 3 weeks.

## 2025-07-13 NOTE — PHYSICAL THERAPY INITIAL EVALUATION ADULT - ADDITIONAL COMMENTS
Patient lives in a family home. To enter the residence, Pt has 5 steps, threshold, then an additional 5steps walk up c Bilateral rails that can be reached simultaneously. Patient has a HHA that completes 23 hours per week ( 5H weekday, 3H weekend). Patient owns a quad cane, TLSO brace, and is anticipating on receiving a Rolling Walker this upcoming Tuesday (7/15/25). In addition, patient complains of pain and stiffness in the right knee and attributes it to arthritis. Patient states considering having an operation performed on said right knee.

## 2025-07-13 NOTE — PHYSICAL THERAPY INITIAL EVALUATION ADULT - PERTINENT HX OF CURRENT PROBLEM, REHAB EVAL
Patient is a 83 y/o female admitted to [facility] due to Physical deconditioning secondary to L1 fracture due to mechanical fall.

## 2025-07-13 NOTE — PHYSICAL THERAPY INITIAL EVALUATION ADULT - NS ASR WT BEARING DETAIL RLE
-Apply a Foam dressing to the Coccyx area Q 72hrs PRN  -Elevate/float the patients heels using heel protectors and reposition the patient Q 2hrs using wedges or pillows 
weight-bearing as tolerated

## 2025-07-13 NOTE — PHYSICAL THERAPY INITIAL EVALUATION ADULT - GENERAL OBSERVATIONS, REHAB EVAL
Chart (EMR) reviewed. Received Pt lying suping c HOB elevated, NAD. +heplock, +primafit. Language barrier (Turkmen).  [email or web address] ID#[id number] was present virtually. Alert. Ox4. Patient was able to follow multistep commands/directions.

## 2025-07-13 NOTE — PATIENT PROFILE ADULT - FALL HARM RISK - HARM RISK INTERVENTIONS

## 2025-07-13 NOTE — PROGRESS NOTE ADULT - ASSESSMENT
Teresa Dallas is an 84 year old female with PMHx of HTN, HLD, GERD, and urinary incontinence who presented to the ED on 7/12/25 for complaints of waist pain and admitted for physical deconditioning secondary to L1 fracture due to mechanical fall.    #Physical deconditioning secondary to L1 fracture due to mechanical fall  Baseline functional status is ambulates with cane  CT L-spine with acute L1 burst compression fracture with moderate loss of height centrally in the setting of osteopenia, mild bony retropulsion, grade 1 L3-S1 anterolisthesis and uncovering of the disc due to hypertrophic facet/ligamentous degeneration  CT pelvis without evidence of acute fracture or dislocation  S/p acetaminophen 650 mg PO, cyclobenzaprine 5 mg PO, lidocaine patch, and morphine 2 mg IV in the ED  Pain management PRN, abdominal binder for comfort, fall precautions  TLSO brace placed, okay for WBAT per ortho  F/u PT/OT  Will need outpatient f/u with orthopedic surgery in 3-5 days  Orthopedic surgery recommendations appreciated      Chronic medical conditions:  HTN: PTA metoprolol succinate 25 mg, furosemide 20 mg  HLD: PTA atorvastatin 10 mg, vascepa 1 g held given not on formulary  GERD: PTA pantoprazole 20 mg reordered as 40 mg  Urinary incontinence: PTA vibegron 75 mg held given not on formulary    Medication reconciliation completed using recent prescription fill history from [facility] as patient does not know med list. Please call her pharmacy for med list in AM. Teresa Dallas is an 84 year old female with PMHx of HTN, HLD, GERD, and urinary incontinence who presented to the ED on 7/12/25 for complaints of waist pain and admitted for physical deconditioning secondary to L1 fracture due to mechanical fall.    #Physical deconditioning secondary to L1 fracture due to mechanical fall  Baseline functional status is ambulates with cane  CT L-spine with acute L1 burst compression fracture with moderate loss of height centrally in the setting of osteopenia, mild bony retropulsion, grade 1 L3-S1 anterolisthesis and uncovering of the disc due to hypertrophic facet/ligamentous degeneration  CT pelvis without evidence of acute fracture or dislocation  S/p acetaminophen 650 mg PO, cyclobenzaprine 5 mg PO, lidocaine patch, and morphine 2 mg IV in the ED  Pain management PRN, abdominal binder for comfort, fall precautions  TLSO brace placed, okay for WBAT per ortho  F/u PT/OT  Will need outpatient f/u with orthopedic surgery in 3-5 days  Orthopedic surgery recommendations appreciated    #Constipation  States last BM was 6 days ago, still passing gas. Abdomen soft non tender   Bowel regimen     Chronic medical conditions:  HTN: PTA metoprolol succinate 25 mg, furosemide 20 mg  HLD: PTA atorvastatin 10 mg, vascepa 1 g held given not on formulary  GERD: PTA pantoprazole 20 mg reordered as 40 mg  Urinary incontinence: PTA vibegron 75 mg held given not on formulary

## 2025-07-13 NOTE — PROGRESS NOTE ADULT - SUBJECTIVE AND OBJECTIVE BOX
Patient is a 84y old  Female who presents with a chief complaint of Physical deconditioning secondary to L1 fracture due to mechanical fall (13 Jul 2025 09:40)      INTERVAL HPI/OVERNIGHT EVENTS:  afebrile vitals stable overnight   seen and examined at bedside     MEDICATIONS  (STANDING):  atorvastatin 10 milliGRAM(s) Oral at bedtime  furosemide    Tablet 20 milliGRAM(s) Oral daily  lidocaine   4% Patch 1 Patch Transdermal daily  metoprolol succinate ER 25 milliGRAM(s) Oral daily  pantoprazole    Tablet 40 milliGRAM(s) Oral before breakfast    MEDICATIONS  (PRN):  acetaminophen     Tablet .. 650 milliGRAM(s) Oral every 6 hours PRN Temp greater or equal to 38C (100.4F), Mild Pain (1 - 3)  aluminum hydroxide/magnesium hydroxide/simethicone Suspension 30 milliLiter(s) Oral every 4 hours PRN Dyspepsia  cyclobenzaprine 5 milliGRAM(s) Oral three times a day PRN Muscle Spasm  melatonin 3 milliGRAM(s) Oral at bedtime PRN Insomnia  morphine  - Injectable 2 milliGRAM(s) IV Push every 6 hours PRN Severe Pain (7 - 10)  ondansetron Injectable 4 milliGRAM(s) IV Push every 8 hours PRN Nausea and/or Vomiting  traMADol 25 milliGRAM(s) Oral every 6 hours PRN Moderate Pain (4 - 6)      Allergies    No Known Allergies    Intolerances        REVIEW OF SYSTEMS:  CONSTITUTIONAL: No fever, weight loss, or fatigue  EYES: No eye pain, visual disturbances, or discharge  ENMT:  No difficulty hearing, tinnitus, vertigo; No sinus or throat pain  NECK: No pain or stiffness  BREASTS: No pain, masses, or nipple discharge  RESPIRATORY: No cough, wheezing, chills or hemoptysis; No shortness of breath  CARDIOVASCULAR: No chest pain, palpitations, dizziness, or leg swelling  GASTROINTESTINAL: No abdominal or epigastric pain. No nausea, vomiting, or hematemesis; No diarrhea or constipation. No melena or hematochezia.  GENITOURINARY: No dysuria, frequency, hematuria, or incontinence  NEUROLOGICAL: No headaches, memory loss, loss of strength, numbness, or tremors  SKIN: No itching, burning, rashes, or lesions   LYMPH NODES: No enlarged glands  ENDOCRINE: No heat or cold intolerance; No hair loss  MUSCULOSKELETAL: No joint pain or swelling; No muscle, back, or extremity pain  PSYCHIATRIC: No depression, anxiety, mood swings, or difficulty sleeping  HEME/LYMPH: No easy bruising, or bleeding gums  ALLERGY AND IMMUNOLOGIC: No hives or eczema    Vital Signs Last 24 Hrs  T(C): 36.8 (13 Jul 2025 09:23), Max: 37.4 (12 Jul 2025 11:12)  T(F): 98.3 (13 Jul 2025 09:23), Max: 99.3 (12 Jul 2025 11:12)  HR: 91 (13 Jul 2025 10:43) (73 - 93)  BP: 125/78 (13 Jul 2025 10:43) (113/72 - 142/82)  BP(mean): --  RR: 16 (13 Jul 2025 09:23) (16 - 18)  SpO2: 93% (13 Jul 2025 10:43) (92% - 95%)    Parameters below as of 13 Jul 2025 10:43  Patient On (Oxygen Delivery Method): room air        PHYSICAL EXAM:  GENERAL: NAD, well-groomed, well-developed  HEAD:  Atraumatic, Normocephalic  EYES: EOMI, PERRLA, conjunctiva and sclera clear  ENMT: No tonsillar erythema, exudates, or enlargement; Moist mucous membranes, Good dentition, No lesions  NECK: Supple, No JVD, Normal thyroid  NERVOUS SYSTEM:  Alert & Oriented X3, Good concentration; Motor Strength 5/5 B/L upper and lower extremities; DTRs 2+ intact and symmetric  CHEST/LUNG: Clear to percussion bilaterally; No rales, rhonchi, wheezing, or rubs  HEART: Regular rate and rhythm; No murmurs, rubs, or gallops  ABDOMEN: Soft, Nontender, Nondistended; Bowel sounds present  EXTREMITIES:  2+ Peripheral Pulses, No clubbing, cyanosis, or edema  LYMPH: No lymphadenopathy noted  SKIN: No rashes or lesions    LABS:                        10.7   6.50  )-----------( 352      ( 12 Jul 2025 18:00 )             34.6     07-12    143  |  109[H]  |  10  ----------------------------<  91  3.6   |  26  |  0.53    Ca    8.9      12 Jul 2025 18:00    TPro  6.7  /  Alb  3.0[L]  /  TBili  0.3  /  DBili  x   /  AST  20  /  ALT  30  /  AlkPhos  72  07-12      Urinalysis Basic - ( 12 Jul 2025 18:00 )    Color: x / Appearance: x / SG: x / pH: x  Gluc: 91 mg/dL / Ketone: x  / Bili: x / Urobili: x   Blood: x / Protein: x / Nitrite: x   Leuk Esterase: x / RBC: x / WBC x   Sq Epi: x / Non Sq Epi: x / Bacteria: x      CAPILLARY BLOOD GLUCOSE          RADIOLOGY & ADDITIONAL TESTS:    Imaging Personally Reviewed:  [ X] YES  [ ] NO    Consultant(s) Notes Reviewed:  [ X] YES  [ ] NO    Care Discussed with Consultants/Other Providers [X ] YES  [ ] NO Patient is a 84y old  Female who presents with a chief complaint of Physical deconditioning secondary to L1 fracture due to mechanical fall (13 Jul 2025 09:40)      INTERVAL HPI/OVERNIGHT EVENTS:  afebrile vitals stable overnight   seen and examined at bedside, patient states pain is much improved       MEDICATIONS  (STANDING):  atorvastatin 10 milliGRAM(s) Oral at bedtime  furosemide    Tablet 20 milliGRAM(s) Oral daily  lidocaine   4% Patch 1 Patch Transdermal daily  metoprolol succinate ER 25 milliGRAM(s) Oral daily  pantoprazole    Tablet 40 milliGRAM(s) Oral before breakfast    MEDICATIONS  (PRN):  acetaminophen     Tablet .. 650 milliGRAM(s) Oral every 6 hours PRN Temp greater or equal to 38C (100.4F), Mild Pain (1 - 3)  aluminum hydroxide/magnesium hydroxide/simethicone Suspension 30 milliLiter(s) Oral every 4 hours PRN Dyspepsia  cyclobenzaprine 5 milliGRAM(s) Oral three times a day PRN Muscle Spasm  melatonin 3 milliGRAM(s) Oral at bedtime PRN Insomnia  morphine  - Injectable 2 milliGRAM(s) IV Push every 6 hours PRN Severe Pain (7 - 10)  ondansetron Injectable 4 milliGRAM(s) IV Push every 8 hours PRN Nausea and/or Vomiting  traMADol 25 milliGRAM(s) Oral every 6 hours PRN Moderate Pain (4 - 6)      Allergies    No Known Allergies    Intolerances        REVIEW OF SYSTEMS:  +back pain improving   +constipation   no abdominal pain CP SOB    Vital Signs Last 24 Hrs  T(C): 36.8 (13 Jul 2025 09:23), Max: 37.4 (12 Jul 2025 11:12)  T(F): 98.3 (13 Jul 2025 09:23), Max: 99.3 (12 Jul 2025 11:12)  HR: 91 (13 Jul 2025 10:43) (73 - 93)  BP: 125/78 (13 Jul 2025 10:43) (113/72 - 142/82)  BP(mean): --  RR: 16 (13 Jul 2025 09:23) (16 - 18)  SpO2: 93% (13 Jul 2025 10:43) (92% - 95%)    Parameters below as of 13 Jul 2025 10:43  Patient On (Oxygen Delivery Method): room air        PHYSICAL EXAM:  GENERAL: NAD, well-groomed, well-developed  HEAD:  Atraumatic, Normocephalic  EYES: EOMI, sclera non-icteric  ENMT:  Moist mucous membranes  NERVOUS SYSTEM:  Alert & Oriented X3, Good concentration answers questions appropriately   CHEST/LUNG: Clear to percussion bilaterally; No rales, rhonchi, wheezing, or rubs  HEART: Regular rate and rhythm  ABDOMEN: Soft, Nontender, Nondistended  EXTREMITIES: no LE edema  SKIN: warm dry       LABS:                        10.7   6.50  )-----------( 352      ( 12 Jul 2025 18:00 )             34.6     07-12    143  |  109[H]  |  10  ----------------------------<  91  3.6   |  26  |  0.53    Ca    8.9      12 Jul 2025 18:00    TPro  6.7  /  Alb  3.0[L]  /  TBili  0.3  /  DBili  x   /  AST  20  /  ALT  30  /  AlkPhos  72  07-12      Urinalysis Basic - ( 12 Jul 2025 18:00 )    Color: x / Appearance: x / SG: x / pH: x  Gluc: 91 mg/dL / Ketone: x  / Bili: x / Urobili: x   Blood: x / Protein: x / Nitrite: x   Leuk Esterase: x / RBC: x / WBC x   Sq Epi: x / Non Sq Epi: x / Bacteria: x      CAPILLARY BLOOD GLUCOSE          RADIOLOGY & ADDITIONAL TESTS:    Imaging Personally Reviewed:  [ X] YES  [ ] NO    Consultant(s) Notes Reviewed:  [ X] YES  [ ] NO    Care Discussed with Consultants/Other Providers [X ] YES  [ ] NO

## 2025-07-13 NOTE — PHYSICAL THERAPY INITIAL EVALUATION ADULT - RANGE OF MOTION EXAMINATION, REHAB EVAL
Except right knee flexion 0-65 degrees due to pain and stiffness/bilateral upper extremity ROM was WFL (within functional limits)/bilateral lower extremity ROM was WFL (within functional limits)/deficits as listed below

## 2025-07-13 NOTE — PATIENT PROFILE ADULT - FUNCTIONAL ASSESSMENT - DAILY ACTIVITY 3.
Patient came in ambulatory with dad from home. Patient was here two days ago for a fever, congestion, and cough. Strep, Flu, and COVID tests were all ran and were negative. Tonight around midnight patient started vomiting and having abdominal pain. Dad brought her here shortly after and she vomited again just before leaving.       Yadira Chatterjee RN  11/21/23 0230 3 = A little assistance

## 2025-07-14 PROCEDURE — 99232 SBSQ HOSP IP/OBS MODERATE 35: CPT

## 2025-07-14 RX ORDER — LACTULOSE 10 G/15ML
10 SOLUTION ORAL ONCE
Refills: 0 | Status: COMPLETED | OUTPATIENT
Start: 2025-07-14 | End: 2025-07-14

## 2025-07-14 RX ADMIN — LACTULOSE 10 GRAM(S): 10 SOLUTION ORAL at 11:15

## 2025-07-14 RX ADMIN — POLYETHYLENE GLYCOL 3350 17 GRAM(S): 17 POWDER, FOR SOLUTION ORAL at 11:15

## 2025-07-14 RX ADMIN — Medication 40 MILLIGRAM(S): at 06:01

## 2025-07-14 RX ADMIN — Medication 650 MILLIGRAM(S): at 09:50

## 2025-07-14 RX ADMIN — ATORVASTATIN CALCIUM 10 MILLIGRAM(S): 80 TABLET, FILM COATED ORAL at 21:16

## 2025-07-14 RX ADMIN — FUROSEMIDE 20 MILLIGRAM(S): 10 INJECTION INTRAMUSCULAR; INTRAVENOUS at 06:00

## 2025-07-14 RX ADMIN — CYCLOBENZAPRINE HYDROCHLORIDE 5 MILLIGRAM(S): 15 CAPSULE, EXTENDED RELEASE ORAL at 09:50

## 2025-07-14 RX ADMIN — METOPROLOL SUCCINATE 25 MILLIGRAM(S): 50 TABLET, EXTENDED RELEASE ORAL at 06:00

## 2025-07-14 RX ADMIN — Medication 650 MILLIGRAM(S): at 10:55

## 2025-07-14 RX ADMIN — LIDOCAINE HYDROCHLORIDE 1 PATCH: 20 JELLY TOPICAL at 11:14

## 2025-07-14 NOTE — OCCUPATIONAL THERAPY INITIAL EVALUATION ADULT - RLE MMT, REHAB EVAL
Grossly equal to or greater then 3/5 hip flexion; Grossly less then 3/5 knee flexion; Grossly greater then 3/5 strength throughout.

## 2025-07-14 NOTE — OCCUPATIONAL THERAPY INITIAL EVALUATION ADULT - REHAB POTENTIAL, OT EVAL
Occupational Therapy Daily Treatment    Visit Count: 12     Referred by: Tavares Thakur MD  Medical Diagnosis (from order):       Diagnosis Information      782.3 (ICD-9-CM) - R60.0 (ICD-10-CM) - Bilateral leg edema   457.1 (ICD-9-CM) - I89.0 (ICD-10-CM) - Lymphedema            Treatment Diagnosis: lower extremity lymphedema with increased pain, increased heaviness, increased girth, tissue abnormalities     Date of onset/injury: 6/11/2020  Diagnosis Precautions: History of: Raynaud's Phenomenon(managed on medication), Aflutter on blood thinner Eliquis and managed by / cardiology, Hx of cellulitis in left leg (per patient).     SUBJECTIVE   Patient reports he wore the bandages for 48 hour without any issues.     Patient went Monday to see the doctor due to left hand infection from cutting his hand while woodworking, started on oral antibiotics Monday at 4 pm. After 48 hours of antibiotics noted significant reduction in left hand swelling and redness, almost resolved completely.     Current Pain (0-10 scale): 0/ 10   Functional Change: \"the toes really went down\"    OBJECTIVE   Lymphedema Measurements:   Refer to scanned form for lymphedema measurements  Left Lower Extremity total girth: 399.8 cm; Right Lower Extremity total girth: 391.8 cm    Treatment   Manual Therapy:   Therapist completed manual lymphatic drainage for the bilateral lower extremity sequence following proximal clearing of the upper body (neck, clavicular, abdominal, and axillary nodes) to improve drainage of lower extremity fluid for reduced limb girth.    Therapist applied multi-layer Comprilan short stretch bandaging (one 8 cm bandage and one 10 cm bandage per leg) to patient's bilateral lower extremities from the metatarsals to the distal popliteal creases for fluid reduction. Prior to application of the short stretch bandages, stockinette, and open cell foam were applied. Size G tubular sleeve applied to bilateral knees/distal  thighs, to remove before bed. Toes on left foot were wrapped with open weave gauze for fluid reduction.     Reviewed with patient the precautions of bandaging. Patient instructed to check toes and to remove the bandages if experiencing pain or signs of toe discoloration or toes cold to touch. Patient verbalized agreement and understanding.     Self-Care:  Educated patient on how to self bandage his legs. Patient able to return demonstration of bandaging his leg with step by step cueing.     Re-educated patient on importance of maintaining proper skin care and protection such as wearing gloves while gardening and woodworking to reduce risk of injury and infection.     Skilled input: as detailed above    Home Program:   For Your Well [employer] ([id number]): Multi-Layer Compression Bandaging  For Your Well [facility] ([id number]): Tubular Bandage Stocking Instructions for daytime use for bilateral lower legs E and G knee/proximal thigh, remove before bed  For Your [facility] ([id number]): Lymphedema Precautions and Skin Care  *Patient is extremely active (bikes 15 miles a day, hikes, power walks) so issuing a LE exercise handout is not necessary at this point.   For Your [facility] ([id number]): Self Lymphatic Leg Massage    Writer verbally educated the patient and received verbal consent from the patient on hand placement, positioning of patient, and techniques to be performed today including clothing adjustments for techniques, therapist position for techniques, hand placement and palpation for techniques, modality application as described above and how they are pertinent to the patient's plan of care.       Suggestions for next session as indicated: progress per plan of care, skin care, self manual lymph drainage, self bandaging.     ASSESSMENT   Patient is responding very well to treatment. Patient verbalizes he is very pleased with results of treatment so far.     Patient has had total reduction since start of care RLE 23.8 cm and LLE  35.2 cm. Patient continues to demonstrate fluid reduction in his BLE each session, with RLE 0.8 cm reduction and LLE 1.8 cm reduction since last visit.     Pain after treatment (patient reported, 0-10 scale): 0  Result of above outlined education: Verbalizes understanding and Needs reinforcement    Procedures and total treatment time documented in Time Entry flowsheet.   good, to achieve stated therapy goals

## 2025-07-14 NOTE — OCCUPATIONAL THERAPY INITIAL EVALUATION ADULT - ADDITIONAL COMMENTS
Patient lives in a family home with 5 steps + threshold step to enter. Once inside, the pt has an additional 5 steps walk up with Bilateral rails that can be reached simultaneously. Patient has a HHA that completes 23 hours per week ( 5H weekday, 3H weekend). Patient owns a quad cane, TLSO brace, and is anticipating on receiving a Rolling Walker this upcoming Tuesday (7/15/25). In addition, patient complains of pain and stiffness in the right knee and attributes it to arthritis. Patient states considering having an operation performed on said right knee.

## 2025-07-14 NOTE — PROGRESS NOTE ADULT - SUBJECTIVE AND OBJECTIVE BOX
Patient is a 84y old  Female who presents with a chief complaint of Physical deconditioning secondary to L1 fracture due to mechanical fall (13 Jul 2025 10:53)      INTERVAL HPI/OVERNIGHT EVENTS:  afebrile vitals stable overnight   seen and examined at bedside     MEDICATIONS  (STANDING):  atorvastatin 10 milliGRAM(s) Oral at bedtime  furosemide    Tablet 20 milliGRAM(s) Oral daily  lidocaine   4% Patch 1 Patch Transdermal daily  metoprolol succinate ER 25 milliGRAM(s) Oral daily  pantoprazole    Tablet 40 milliGRAM(s) Oral before breakfast  polyethylene glycol 3350 17 Gram(s) Oral daily  senna 1 Tablet(s) Oral once  senna 2 Tablet(s) Oral at bedtime    MEDICATIONS  (PRN):  acetaminophen     Tablet .. 650 milliGRAM(s) Oral every 6 hours PRN Temp greater or equal to 38C (100.4F), Mild Pain (1 - 3)  aluminum hydroxide/magnesium hydroxide/simethicone Suspension 30 milliLiter(s) Oral every 4 hours PRN Dyspepsia  cyclobenzaprine 5 milliGRAM(s) Oral three times a day PRN Muscle Spasm  melatonin 3 milliGRAM(s) Oral at bedtime PRN Insomnia  ondansetron Injectable 4 milliGRAM(s) IV Push every 8 hours PRN Nausea and/or Vomiting      Allergies    No Known Allergies    Intolerances        REVIEW OF SYSTEMS:  CONSTITUTIONAL: No fever, weight loss, or fatigue  EYES: No eye pain, visual disturbances, or discharge  ENMT:  No difficulty hearing, tinnitus, vertigo; No sinus or throat pain  NECK: No pain or stiffness  BREASTS: No pain, masses, or nipple discharge  RESPIRATORY: No cough, wheezing, chills or hemoptysis; No shortness of breath  CARDIOVASCULAR: No chest pain, palpitations, dizziness, or leg swelling  GASTROINTESTINAL: No abdominal or epigastric pain. No nausea, vomiting, or hematemesis; No diarrhea or constipation. No melena or hematochezia.  GENITOURINARY: No dysuria, frequency, hematuria, or incontinence  NEUROLOGICAL: No headaches, memory loss, loss of strength, numbness, or tremors  SKIN: No itching, burning, rashes, or lesions   LYMPH NODES: No enlarged glands  ENDOCRINE: No heat or cold intolerance; No hair loss  MUSCULOSKELETAL: No joint pain or swelling; No muscle, back, or extremity pain  PSYCHIATRIC: No depression, anxiety, mood swings, or difficulty sleeping  HEME/LYMPH: No easy bruising, or bleeding gums  ALLERGY AND IMMUNOLOGIC: No hives or eczema    Vital Signs Last 24 Hrs  T(C): 36.6 (14 Jul 2025 05:13), Max: 37.5 (13 Jul 2025 17:33)  T(F): 97.8 (14 Jul 2025 05:13), Max: 99.5 (13 Jul 2025 17:33)  HR: 84 (14 Jul 2025 05:13) (79 - 91)  BP: 107/68 (14 Jul 2025 05:13) (103/70 - 132/84)  BP(mean): --  RR: 18 (14 Jul 2025 05:13) (16 - 18)  SpO2: 91% (14 Jul 2025 05:13) (91% - 94%)    Parameters below as of 13 Jul 2025 10:43  Patient On (Oxygen Delivery Method): room air        PHYSICAL EXAM:  GENERAL: NAD, well-groomed, well-developed  HEAD:  Atraumatic, Normocephalic  EYES: EOMI, sclera non-icteric  ENMT:  Moist mucous membranes  NERVOUS SYSTEM:  Alert & Oriented X3, Good concentration answers questions appropriately   CHEST/LUNG: Clear to percussion bilaterally; No rales, rhonchi, wheezing, or rubs  HEART: Regular rate and rhythm  ABDOMEN: Soft, Nontender, Nondistended  EXTREMITIES: no LE edema  SKIN: warm dry     LABS:                        10.7   6.50  )-----------( 352      ( 12 Jul 2025 18:00 )             34.6     07-12    143  |  109[H]  |  10  ----------------------------<  91  3.6   |  26  |  0.53    Ca    8.9      12 Jul 2025 18:00    TPro  6.7  /  Alb  3.0[L]  /  TBili  0.3  /  DBili  x   /  AST  20  /  ALT  30  /  AlkPhos  72  07-12      Urinalysis Basic - ( 12 Jul 2025 18:00 )    Color: x / Appearance: x / SG: x / pH: x  Gluc: 91 mg/dL / Ketone: x  / Bili: x / Urobili: x   Blood: x / Protein: x / Nitrite: x   Leuk Esterase: x / RBC: x / WBC x   Sq Epi: x / Non Sq Epi: x / Bacteria: x      CAPILLARY BLOOD GLUCOSE          RADIOLOGY & ADDITIONAL TESTS:    Imaging Personally Reviewed:  [ X] YES  [ ] NO    Consultant(s) Notes Reviewed:  [ X] YES  [ ] NO    Care Discussed with Consultants/Other Providers [X ] YES  [ ] NO Patient is a 84y old  Female who presents with a chief complaint of Physical deconditioning secondary to L1 fracture due to mechanical fall (13 Jul 2025 10:53)      INTERVAL HPI/OVERNIGHT EVENTS:  afebrile vitals stable overnight   seen and examined at bedside   1BM documented in the flowsheet   #[id number] used during encounter, pain improved     MEDICATIONS  (STANDING):  atorvastatin 10 milliGRAM(s) Oral at bedtime  furosemide    Tablet 20 milliGRAM(s) Oral daily  lidocaine   4% Patch 1 Patch Transdermal daily  metoprolol succinate ER 25 milliGRAM(s) Oral daily  pantoprazole    Tablet 40 milliGRAM(s) Oral before breakfast  polyethylene glycol 3350 17 Gram(s) Oral daily  senna 1 Tablet(s) Oral once  senna 2 Tablet(s) Oral at bedtime    MEDICATIONS  (PRN):  acetaminophen     Tablet .. 650 milliGRAM(s) Oral every 6 hours PRN Temp greater or equal to 38C (100.4F), Mild Pain (1 - 3)  aluminum hydroxide/magnesium hydroxide/simethicone Suspension 30 milliLiter(s) Oral every 4 hours PRN Dyspepsia  cyclobenzaprine 5 milliGRAM(s) Oral three times a day PRN Muscle Spasm  melatonin 3 milliGRAM(s) Oral at bedtime PRN Insomnia  ondansetron Injectable 4 milliGRAM(s) IV Push every 8 hours PRN Nausea and/or Vomiting      Allergies    No Known Allergies    Intolerances        REVIEW OF SYSTEMS:  +back pain improved   +R knee pain, chronic   no CP abdominal pain   +constipation    Vital Signs Last 24 Hrs  T(C): 36.6 (14 Jul 2025 05:13), Max: 37.5 (13 Jul 2025 17:33)  T(F): 97.8 (14 Jul 2025 05:13), Max: 99.5 (13 Jul 2025 17:33)  HR: 84 (14 Jul 2025 05:13) (79 - 91)  BP: 107/68 (14 Jul 2025 05:13) (103/70 - 132/84)  BP(mean): --  RR: 18 (14 Jul 2025 05:13) (16 - 18)  SpO2: 91% (14 Jul 2025 05:13) (91% - 94%)    Parameters below as of 13 Jul 2025 10:43  Patient On (Oxygen Delivery Method): room air        PHYSICAL EXAM:  GENERAL: NAD, well-groomed, well-developed  HEAD:  Atraumatic, Normocephalic  EYES: EOMI, sclera non-icteric  ENMT:  Moist mucous membranes  NERVOUS SYSTEM:  Alert & Oriented X3, Good concentration answers questions appropriately   CHEST/LUNG: Clear to percussion bilaterally; No rales, rhonchi, wheezing, or rubs  HEART: Regular rate and rhythm  ABDOMEN: Soft, Nontender, Nondistended  EXTREMITIES: no LE edema   SKIN: warm dry     LABS:                        10.7   6.50  )-----------( 352      ( 12 Jul 2025 18:00 )             34.6     07-12    143  |  109[H]  |  10  ----------------------------<  91  3.6   |  26  |  0.53    Ca    8.9      12 Jul 2025 18:00    TPro  6.7  /  Alb  3.0[L]  /  TBili  0.3  /  DBili  x   /  AST  20  /  ALT  30  /  AlkPhos  72  07-12      Urinalysis Basic - ( 12 Jul 2025 18:00 )    Color: x / Appearance: x / SG: x / pH: x  Gluc: 91 mg/dL / Ketone: x  / Bili: x / Urobili: x   Blood: x / Protein: x / Nitrite: x   Leuk Esterase: x / RBC: x / WBC x   Sq Epi: x / Non Sq Epi: x / Bacteria: x      CAPILLARY BLOOD GLUCOSE          RADIOLOGY & ADDITIONAL TESTS:    Imaging Personally Reviewed:  [ X] YES  [ ] NO    Consultant(s) Notes Reviewed:  [ X] YES  [ ] NO    Care Discussed with Consultants/Other Providers [X ] YES  [ ] NO

## 2025-07-14 NOTE — OCCUPATIONAL THERAPY INITIAL EVALUATION ADULT - WEIGHT-BEARING RESTRICTIONS: STAND/SIT, REHAB EVAL
From: Michele Warren  To: Kenrick MIGUELITO Jose Carlos  Sent: 11/14/2023 8:49 PM CST  Subject: Illnees    On Nov 1st Michele had a fever that lasted 3 days. After his fever he developed a runny nose. I thought it was teething at first but now he's developed a barking cough along with red yellow crusted eyes. I know there are 2 different kinds of pink eye. I believe it's viral as his eyes have been like this since Friday and he has child symptoms with it. Should I come in?     Thank you   BLE/weight-bearing as tolerated

## 2025-07-14 NOTE — PROGRESS NOTE ADULT - ASSESSMENT
Teresa Dallas is an 84 year old female with PMHx of HTN, HLD, GERD, and urinary incontinence who presented to the ED on 7/12/25 for complaints of waist pain and admitted for physical deconditioning secondary to L1 fracture due to mechanical fall.    #Physical deconditioning secondary to L1 fracture due to mechanical fall  Baseline functional status is ambulates with cane  CT L-spine with acute L1 burst compression fracture with moderate loss of height centrally in the setting of osteopenia, mild bony retropulsion, grade 1 L3-S1 anterolisthesis and uncovering of the disc due to hypertrophic facet/ligamentous degeneration  CT pelvis without evidence of acute fracture or dislocation  S/p acetaminophen 650 mg PO, cyclobenzaprine 5 mg PO, lidocaine patch, and morphine 2 mg IV in the ED  Pain management PRN, abdominal binder for comfort, fall precautions  TLSO brace placed, okay for WBAT per ortho  F/u PT/OT-->[facility]  Will need outpatient f/u with orthopedic surgery in 3-5 days  Orthopedic surgery recommendations appreciated    #Constipation  States last BM was 6 days ago, still passing gas. Abdomen soft non tender   Bowel regimen     Chronic medical conditions:  HTN: PTA metoprolol succinate 25 mg, furosemide 20 mg  HLD: PTA atorvastatin 10 mg, vascepa 1 g held given not on formulary  GERD: PTA pantoprazole 20 mg reordered as 40 mg  Urinary incontinence: PTA vibegron 75 mg held given not on formulary     Teresa Dallas is an 84 year old female with PMHx of HTN, HLD, GERD, and urinary incontinence who presented to the ED on 7/12/25 for complaints of waist pain and admitted for physical deconditioning secondary to L1 fracture due to mechanical fall.    #Physical deconditioning secondary to L1 fracture due to mechanical fall  Baseline functional status is ambulates with cane  CT L-spine with acute L1 burst compression fracture with moderate loss of height centrally in the setting of osteopenia, mild bony retropulsion, grade 1 L3-S1 anterolisthesis and uncovering of the disc due to hypertrophic facet/ligamentous degeneration  CT pelvis without evidence of acute fracture or dislocation  S/p acetaminophen 650 mg PO, cyclobenzaprine 5 mg PO, lidocaine patch, and morphine 2 mg IV in the ED  Pain management PRN, abdominal binder for comfort, fall precautions  TLSO brace placed, okay for WBAT per ortho  F/u PT/OT-->[facility]  Will need outpatient f/u with orthopedic surgery in 3-5 days  Orthopedic surgery recommendations appreciated    #Constipation  States last BM was 6 days ago, still passing gas. Abdomen soft non tender   Bowel regimen   1 BM documented 7/14    Chronic medical conditions:  HTN: PTA metoprolol succinate 25 mg, furosemide 20 mg  HLD: PTA atorvastatin 10 mg, vascepa 1 g held given not on formulary  GERD: PTA pantoprazole 20 mg reordered as 40 mg  Urinary incontinence: PTA vibegron 75 mg held given not on formulary    Inpatient pharmacist assisted in medication reconciliation today.

## 2025-07-14 NOTE — OCCUPATIONAL THERAPY INITIAL EVALUATION ADULT - GENERAL OBSERVATIONS, REHAB EVAL
Pt was encountered supine in bed; NAD, primafit +, alert, cooperative, followed commands; pt c/o pain in lower back which limits pt performance with functional ADL's/transfers and mobility. As per ortho, pt requires TLSO brace OOB. Pt is Romanian speaking and requires [employer]  service. OT and pt spoke with [facility]anto ID: [id number].

## 2025-07-14 NOTE — OCCUPATIONAL THERAPY INITIAL EVALUATION ADULT - PERTINENT HX OF CURRENT PROBLEM, REHAB EVAL
As per H&P; Teresa Dallas is an 84 year old female with PMHx of HTN, HLD, GERD, and urinary incontinence who presented to the ED on 7/12/25 for complaints of waist pain.    Buck ID #[id number]. Patient reports she was going to use the bathroom on Monday and fell, landed on her back and hit her head. No loss of consciousness. At that time, went to an ER in [place] and underwent head imaging which was reportedly negative. Discharged home and able to ambulate at that time. The following day, on Tuesday, started having pain around her waist. Described as "so strong I was unable to walk." Severity was 10/10. Did take analgesics which only temporarily alleviated pain. No associated saddle anesthesia or bowel incontinence. Has baseline urinary incontinence. Came to the ER for further evaluation. Baseline functional status is ambulates with cane and dependent with all ADLs. Lives at home with sister and brother-in-law.    In the ED, VSS except Tmax 99.3. Labs grossly unremarkable except hgb 10.7. CT L-spine with acute L1 burst compression fracture with moderate loss of height centrally in the setting of osteopenia. Mild bony retropulsion. Grade 1 L3-S1 anterolisthesis and uncovering of the disc due to hypertrophic facet/ligamentous degeneration. CT pelvis without evidence of acute fracture or dislocation. Received acetaminophen 650 mg PO, cyclobenzaprine 5 mg PO, lidocaine patch, and morphine 2 mg IV. Evaluated by orthopedic surgery who recommends WBAT and abdominal binder for comfort.

## 2025-07-15 PROCEDURE — 99232 SBSQ HOSP IP/OBS MODERATE 35: CPT

## 2025-07-15 RX ADMIN — LIDOCAINE HYDROCHLORIDE 1 PATCH: 20 JELLY TOPICAL at 23:30

## 2025-07-15 RX ADMIN — METOPROLOL SUCCINATE 25 MILLIGRAM(S): 50 TABLET, EXTENDED RELEASE ORAL at 06:49

## 2025-07-15 RX ADMIN — Medication 650 MILLIGRAM(S): at 06:50

## 2025-07-15 RX ADMIN — Medication 2 TABLET(S): at 21:26

## 2025-07-15 RX ADMIN — Medication 650 MILLIGRAM(S): at 14:59

## 2025-07-15 RX ADMIN — Medication 650 MILLIGRAM(S): at 21:26

## 2025-07-15 RX ADMIN — FUROSEMIDE 20 MILLIGRAM(S): 10 INJECTION INTRAMUSCULAR; INTRAVENOUS at 06:49

## 2025-07-15 RX ADMIN — LIDOCAINE HYDROCHLORIDE 1 PATCH: 20 JELLY TOPICAL at 11:28

## 2025-07-15 RX ADMIN — Medication 40 MILLIGRAM(S): at 06:49

## 2025-07-15 RX ADMIN — Medication 650 MILLIGRAM(S): at 07:20

## 2025-07-15 RX ADMIN — LIDOCAINE HYDROCHLORIDE 1 PATCH: 20 JELLY TOPICAL at 19:00

## 2025-07-15 RX ADMIN — POLYETHYLENE GLYCOL 3350 17 GRAM(S): 17 POWDER, FOR SOLUTION ORAL at 11:29

## 2025-07-15 RX ADMIN — Medication 650 MILLIGRAM(S): at 21:56

## 2025-07-15 NOTE — PROGRESS NOTE ADULT - TIME BILLING
Lab test review, Radiology Review, Vitals review, Consultant review and discussion, Physical examination, IDR, Assessment and plan; Plan discussion with patient

## 2025-07-15 NOTE — PROGRESS NOTE ADULT - REASON FOR ADMISSION
Physical deconditioning secondary to L1 fracture due to mechanical fall

## 2025-07-15 NOTE — PROGRESS NOTE ADULT - PROVIDER SPECIALTY LIST ADULT
Hospitalist
[facility]  Radiology needs to have the patients order for PET CT  To be revised because the order should be Skull to thigh CPT code 52999     Please fax to [phone]    Please call to advise
Hospitalist
Hospitalist
Orthopedics

## 2025-07-15 NOTE — PROGRESS NOTE ADULT - SUBJECTIVE AND OBJECTIVE BOX
Patient is a 84y old  Female who presents with a chief complaint of Physical deconditioning secondary to L1 fracture due to mechanical fall (14 Jul 2025 09:13)      INTERVAL HPI/OVERNIGHT EVENTS:  nothing acute overnight   seen and examined at bedside, had a BM this AM. pain well controlled    #[id number] used during encounter     MEDICATIONS  (STANDING):  atorvastatin 10 milliGRAM(s) Oral at bedtime  furosemide    Tablet 20 milliGRAM(s) Oral daily  lidocaine   4% Patch 1 Patch Transdermal daily  metoprolol succinate ER 25 milliGRAM(s) Oral daily  pantoprazole    Tablet 40 milliGRAM(s) Oral before breakfast  polyethylene glycol 3350 17 Gram(s) Oral daily  senna 1 Tablet(s) Oral once  senna 2 Tablet(s) Oral at bedtime    MEDICATIONS  (PRN):  acetaminophen     Tablet .. 650 milliGRAM(s) Oral every 6 hours PRN Temp greater or equal to 38C (100.4F), Mild Pain (1 - 3)  aluminum hydroxide/magnesium hydroxide/simethicone Suspension 30 milliLiter(s) Oral every 4 hours PRN Dyspepsia  cyclobenzaprine 5 milliGRAM(s) Oral three times a day PRN Muscle Spasm  melatonin 3 milliGRAM(s) Oral at bedtime PRN Insomnia  ondansetron Injectable 4 milliGRAM(s) IV Push every 8 hours PRN Nausea and/or Vomiting      Allergies    No Known Allergies    Intolerances        REVIEW OF SYSTEMS:  +back pain improving     Vital Signs Last 24 Hrs  T(C): 37 (15 Jul 2025 10:58), Max: 37.9 (14 Jul 2025 23:49)  T(F): 98.6 (15 Jul 2025 10:58), Max: 100.2 (14 Jul 2025 23:49)  HR: 89 (15 Jul 2025 10:58) (89 - 100)  BP: 123/77 (15 Jul 2025 10:58) (105/58 - 135/76)  BP(mean): --  RR: 17 (15 Jul 2025 10:58) (17 - 17)  SpO2: 91% (15 Jul 2025 10:58) (91% - 95%)    Parameters below as of 15 Jul 2025 10:58  Patient On (Oxygen Delivery Method): room air        PHYSICAL EXAM:  GENERAL: NAD, well-groomed, well-developed  HEAD:  Atraumatic, Normocephalic  EYES: EOMI, sclera non-icteric  ENMT:  Moist mucous membranes  NERVOUS SYSTEM:  Alert & Oriented X3, Good concentration answers questions appropriately   CHEST/LUNG: Clear to percussion bilaterally; No rales, rhonchi, wheezing, or rubs  HEART: Regular rate and rhythm  ABDOMEN: Soft, Nontender, Nondistended  EXTREMITIES: no LE edema   SKIN: warm dry     LABS:              CAPILLARY BLOOD GLUCOSE          RADIOLOGY & ADDITIONAL TESTS:    Imaging Personally Reviewed:  [ X] YES  [ ] NO    Consultant(s) Notes Reviewed:  [ X] YES  [ ] NO    Care Discussed with Consultants/Other Providers [X ] YES  [ ] NO

## 2025-07-15 NOTE — PROGRESS NOTE ADULT - ASSESSMENT
Teresa Dallas is an 84 year old female with PMHx of HTN, HLD, GERD, and urinary incontinence who presented to the ED on 7/12/25 for complaints of waist pain and admitted for physical deconditioning secondary to L1 fracture due to mechanical fall.    #Physical deconditioning secondary to L1 fracture due to mechanical fall  Baseline functional status is ambulates with cane  CT L-spine with acute L1 burst compression fracture with moderate loss of height centrally in the setting of osteopenia, mild bony retropulsion, grade 1 L3-S1 anterolisthesis and uncovering of the disc due to hypertrophic facet/ligamentous degeneration  CT pelvis without evidence of acute fracture or dislocation  S/p acetaminophen 650 mg PO, cyclobenzaprine 5 mg PO, lidocaine patch, and morphine 2 mg IV in the ED  Pain management PRN, abdominal binder for comfort, fall precautions  TLSO brace placed, okay for WBAT per ortho  F/u PT/OT-->[facility]  Will need outpatient f/u with orthopedic surgery in 3-5 days  Orthopedic surgery recommendations appreciated    #Constipation  States last BM was 6 days ago, still passing gas. Abdomen soft non tender   Bowel regimen   1 BM documented 7/14    Chronic medical conditions:  HTN: PTA metoprolol succinate 25 mg, furosemide 20 mg  HLD: PTA atorvastatin 10 mg, vascepa 1 g held given not on formulary  GERD: PTA pantoprazole 20 mg reordered as 40 mg  Urinary incontinence: PTA vibegron 75 mg held given not on formulary    Medically stable for dc to [facility], auth pending.

## 2025-07-16 ENCOUNTER — TRANSCRIPTION ENCOUNTER (OUTPATIENT)
Age: 85
End: 2025-07-16

## 2025-07-16 VITALS
OXYGEN SATURATION: 91 % | DIASTOLIC BLOOD PRESSURE: 75 MMHG | TEMPERATURE: 98 F | RESPIRATION RATE: 18 BRPM | SYSTOLIC BLOOD PRESSURE: 123 MMHG | HEART RATE: 84 BPM

## 2025-07-16 PROCEDURE — 99239 HOSP IP/OBS DSCHRG MGMT >30: CPT

## 2025-07-16 RX ORDER — POLYETHYLENE GLYCOL 3350 17 G/17G
17 POWDER, FOR SOLUTION ORAL
Qty: 0 | Refills: 0 | DISCHARGE
Start: 2025-07-16

## 2025-07-16 RX ORDER — ACETAMINOPHEN 500 MG/5ML
2 LIQUID (ML) ORAL
Qty: 0 | Refills: 0 | DISCHARGE
Start: 2025-07-16

## 2025-07-16 RX ORDER — SENNA 187 MG
2 TABLET ORAL
Qty: 0 | Refills: 0 | DISCHARGE
Start: 2025-07-16

## 2025-07-16 RX ORDER — LIDOCAINE HYDROCHLORIDE 20 MG/ML
1 JELLY TOPICAL
Qty: 0 | Refills: 0 | DISCHARGE
Start: 2025-07-16

## 2025-07-16 RX ORDER — CYCLOBENZAPRINE HYDROCHLORIDE 15 MG/1
1 CAPSULE, EXTENDED RELEASE ORAL
Qty: 0 | Refills: 0 | DISCHARGE
Start: 2025-07-16

## 2025-07-16 RX ADMIN — POLYETHYLENE GLYCOL 3350 17 GRAM(S): 17 POWDER, FOR SOLUTION ORAL at 11:35

## 2025-07-16 RX ADMIN — FUROSEMIDE 20 MILLIGRAM(S): 10 INJECTION INTRAMUSCULAR; INTRAVENOUS at 06:45

## 2025-07-16 RX ADMIN — LIDOCAINE HYDROCHLORIDE 1 PATCH: 20 JELLY TOPICAL at 11:34

## 2025-07-16 RX ADMIN — METOPROLOL SUCCINATE 25 MILLIGRAM(S): 50 TABLET, EXTENDED RELEASE ORAL at 06:46

## 2025-07-16 RX ADMIN — Medication 650 MILLIGRAM(S): at 06:48

## 2025-07-16 NOTE — DISCHARGE NOTE PROVIDER - NSDCMRMEDTOKEN_GEN_ALL_CORE_FT
acetaminophen 325 mg oral tablet: 2 tab(s) orally every 6 hours As needed Temp greater or equal to 38C (100.4F), Mild Pain (1 - 3)  atorvastatin 10 mg oral tablet: 1 tab(s) orally once a day  cyclobenzaprine 5 mg oral tablet: 1 tab(s) orally 3 times a day As needed Muscle Spasm  furosemide 20 mg oral tablet: 1 tab(s) orally once a day  ibandronate 150 mg oral tablet: 1 tab(s) orally once a day  icosapent 1 g oral capsule: 2 cap(s) orally once a day  lidocaine 4% topical film: Apply topically to affected area once a day  metoprolol succinate 25 mg oral tablet, extended release: 1 tab(s) orally once a day  pantoprazole 40 mg oral delayed release tablet: 1 tab(s) orally once a day (before a meal)  polyethylene glycol 3350 oral powder for reconstitution: 17 gram(s) orally once a day  senna leaf extract oral tablet: 2 tab(s) orally once a day (at bedtime)  TLSO Brace: Wear at all times while ambulating  vibegron 75 mg oral tablet: 1 tab(s) orally once a day

## 2025-07-16 NOTE — DISCHARGE NOTE PROVIDER - ATTENDING DISCHARGE PHYSICAL EXAMINATION:
GENERAL: NAD, well-groomed, well-developed  ENMT:  Moist mucous membranes  NERVOUS SYSTEM:  Alert & Oriented X3, Good concentration answers questions appropriately   CHEST/LUNG: Clear to percussion bilaterally; No rales, rhonchi, wheezing, or rubs  HEART: Regular rate and rhythm  ABDOMEN: Soft, Nontender, Nondistended  EXTREMITIES: no LE edema   SKIN: warm dry

## 2025-07-16 NOTE — DISCHARGE NOTE PROVIDER - NSDCCPCAREPLAN_GEN_ALL_CORE_FT
PRINCIPAL DISCHARGE DIAGNOSIS  Diagnosis: Lumbar burst fracture  Assessment and Plan of Treatment: You came in with lower back pain and we found that you have a compression fraction in your bone. You were recommended for physical therapy at a rehab hospital. Please continue PT at your subacute rehab. You should also wear a TLSO brace while ambulating. Please continue to take medications as written. You will also need to follow up with an orthopedic surgeon within 3 days. Please call Dr. Gonzalez office for an appointment.      SECONDARY DISCHARGE DIAGNOSES  Diagnosis: Lumbar burst fracture  Assessment and Plan of Treatment:

## 2025-07-16 NOTE — DISCHARGE NOTE PROVIDER - CARE PROVIDER_API CALL
DAVIAN CRAMER  Orthopaedic Surgery  [address]  [place] [address]  Phone: [phone]  Fax: [phone]  Follow Up Time: 1-3 days    Johny Bhatti)  Internal Medicine  [address]  [place] [address]  Phone: [phone]  Fax: [phone]  Follow Up Time: 1 week

## 2025-07-16 NOTE — DISCHARGE NOTE PROVIDER - PROVIDER TOKENS
PROVIDER:[TOKEN:[[id number]],FOLLOWUP:[1-3 days]],PROVIDER:[TOKEN:[[id number]],FOLLOWUP:[1 week]]

## 2025-07-16 NOTE — DISCHARGE NOTE NURSING/CASE MANAGEMENT/SOCIAL WORK - PATIENT PORTAL LINK FT
You can access the FollowMyHealth Patient Portal offered by [facility] by registering at the following website: [email or web address]. By joining [employer]’s FollowMyHealth portal, you will also be able to view your health information using other applications (apps) compatible with our system.

## 2025-07-16 NOTE — DISCHARGE NOTE PROVIDER - HOSPITAL COURSE
84 year old female with PMHx of HTN, HLD, GERD, and urinary incontinence who presented to the ED on 7/12/25 for complaints of waist pain and admitted for physical deconditioning secondary to L1 fracture due to mechanical fall.    #Physical deconditioning secondary to L1 fracture due to mechanical fall  Baseline functional status is ambulates with cane  CT L-spine with acute L1 burst compression fracture with moderate loss of height centrally in the setting of osteopenia, mild bony retropulsion, grade 1 L3-S1 anterolisthesis and uncovering of the disc due to hypertrophic facet/ligamentous degeneration  CT pelvis without evidence of acute fracture or dislocation  S/p acetaminophen 650 mg PO, cyclobenzaprine 5 mg PO, lidocaine patch, and morphine 2 mg IV in the ED  Pain management PRN, abdominal binder for comfort, fall precautions  TLSO brace placed, okay for WBAT per ortho  F/u PT/OT-->[facility]  Will need outpatient f/u with orthopedic surgery in 3-5 days  Orthopedic surgery recommendations appreciated    #Constipation  States last BM was 6 days ago, still passing gas. Abdomen soft non tender   Bowel regimen   1 BM documented 7/14    Chronic medical conditions:  HTN: PTA metoprolol succinate 25 mg, furosemide 20 mg  HLD: PTA atorvastatin 10 mg, vascepa 1 g held given not on formulary  GERD: PTA pantoprazole 20 mg reordered as 40 mg  Urinary incontinence: PTA vibegron 75 mg held given not on formulary

## 2025-07-16 NOTE — DISCHARGE NOTE NURSING/CASE MANAGEMENT/SOCIAL WORK - NSDCFUADDAPPT_GEN_ALL_CORE_FT
Writer received call from [facility] regarding pt who is scheduled for cardioversion this Friday, 10/20 at 0800 with Dr. Kashinath and indicated that pt is also scheduled for an Echo at [facility] on 10/20.      Writer contacted pt and informed him that I writer would route a message to determine if Echo is required and if yes for Echo to be rescheduled. Please advise   APPTS ARE READY TO BE MADE: [X] YES    Best Family or Patient Contact (if needed):    Additional Information about above appointments (if needed):    1:   2:   3:     Other comments or requests:

## 2025-07-16 NOTE — DISCHARGE NOTE NURSING/CASE MANAGEMENT/SOCIAL WORK - NSDCVIVACCINE_GEN_ALL_CORE_FT
Tdap; 24-Sep-2022 17:42; Johanna Leos); [employer]; [id number] (Exp. Date: 29-Nov-2024); IntraMuscular; Deltoid Left.; 0.5 milliLiter(s); VIS (VIS Published: 09-May-2013, VIS Presented: 24-Sep-2022);

## 2025-07-16 NOTE — DISCHARGE NOTE NURSING/CASE MANAGEMENT/SOCIAL WORK - FINANCIAL ASSISTANCE
[facility] provides services at a reduced cost to those who are determined to be eligible through [facility]’s financial assistance program. Information regarding [facility]’s financial assistance program can be found by going to [email or web address] or by calling [phone].

## 2025-07-16 NOTE — DISCHARGE NOTE PROVIDER - NSDCFUADDAPPT_GEN_ALL_CORE_FT
APPTS ARE READY TO BE MADE: [X] YES    Best Family or Patient Contact (if needed):    Additional Information about above appointments (if needed):    1:   2:   3:     Other comments or requests:    APPTS ARE READY TO BE MADE: [X] YES    Best Family or Patient Contact (if needed):    Additional Information about above appointments (if needed):    1:   2:   3:     Other comments or requests:     Patient is being transferred. Caregiver will arrange follow up.
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
## 2025-07-22 DIAGNOSIS — Z79.899 OTHER LONG TERM (CURRENT) DRUG THERAPY: ICD-10-CM

## 2025-07-22 DIAGNOSIS — S32.011A STABLE BURST FRACTURE OF FIRST LUMBAR VERTEBRA, INITIAL ENCOUNTER FOR CLOSED FRACTURE: ICD-10-CM

## 2025-07-22 DIAGNOSIS — R32 UNSPECIFIED URINARY INCONTINENCE: ICD-10-CM

## 2025-07-22 DIAGNOSIS — K21.9 GASTRO-ESOPHAGEAL REFLUX DISEASE WITHOUT ESOPHAGITIS: ICD-10-CM

## 2025-07-22 DIAGNOSIS — K59.00 CONSTIPATION, UNSPECIFIED: ICD-10-CM

## 2025-07-22 DIAGNOSIS — M85.80 OTHER SPECIFIED DISORDERS OF BONE DENSITY AND STRUCTURE, UNSPECIFIED SITE: ICD-10-CM

## 2025-07-22 DIAGNOSIS — I10 ESSENTIAL (PRIMARY) HYPERTENSION: ICD-10-CM

## 2025-07-22 DIAGNOSIS — M54.50 LOW BACK PAIN, UNSPECIFIED: ICD-10-CM

## 2025-07-22 DIAGNOSIS — E78.5 HYPERLIPIDEMIA, UNSPECIFIED: ICD-10-CM

## 2025-07-22 DIAGNOSIS — M43.17 SPONDYLOLISTHESIS, LUMBOSACRAL REGION: ICD-10-CM
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
